# Patient Record
Sex: FEMALE | Race: BLACK OR AFRICAN AMERICAN | NOT HISPANIC OR LATINO | Employment: OTHER | URBAN - METROPOLITAN AREA
[De-identification: names, ages, dates, MRNs, and addresses within clinical notes are randomized per-mention and may not be internally consistent; named-entity substitution may affect disease eponyms.]

---

## 2023-06-02 LAB
ALANINE AMINOTRANSFERASE (SGPT) (U/L) IN SER/PLAS: 6 U/L (ref 7–45)
ALBUMIN (G/DL) IN SER/PLAS: 4.2 G/DL (ref 3.4–5)
ALKALINE PHOSPHATASE (U/L) IN SER/PLAS: 128 U/L (ref 33–136)
ANION GAP IN SER/PLAS: 11 MMOL/L (ref 10–20)
ASPARTATE AMINOTRANSFERASE (SGOT) (U/L) IN SER/PLAS: 11 U/L (ref 9–39)
BILIRUBIN TOTAL (MG/DL) IN SER/PLAS: 0.4 MG/DL (ref 0–1.2)
CALCIUM (MG/DL) IN SER/PLAS: 9.9 MG/DL (ref 8.6–10.6)
CARBON DIOXIDE, TOTAL (MMOL/L) IN SER/PLAS: 25 MMOL/L (ref 21–32)
CHLORIDE (MMOL/L) IN SER/PLAS: 114 MMOL/L (ref 98–107)
CHOLESTEROL (MG/DL) IN SER/PLAS: 170 MG/DL (ref 0–199)
CHOLESTEROL IN HDL (MG/DL) IN SER/PLAS: 38.8 MG/DL
CHOLESTEROL IN LDL (MG/DL) IN SER/PLAS BY DIRECT ASSAY: 126 MG/DL (ref 0–129)
CHOLESTEROL/HDL RATIO: 4.4
CREATININE (MG/DL) IN SER/PLAS: 1.29 MG/DL (ref 0.5–1.05)
ERYTHROCYTE DISTRIBUTION WIDTH (RATIO) BY AUTOMATED COUNT: 13.5 % (ref 11.5–14.5)
ERYTHROCYTE MEAN CORPUSCULAR HEMOGLOBIN CONCENTRATION (G/DL) BY AUTOMATED: 31.1 G/DL (ref 32–36)
ERYTHROCYTE MEAN CORPUSCULAR VOLUME (FL) BY AUTOMATED COUNT: 100 FL (ref 80–100)
ERYTHROCYTES (10*6/UL) IN BLOOD BY AUTOMATED COUNT: 5.04 X10E12/L (ref 4–5.2)
GFR FEMALE: 45 ML/MIN/1.73M2
GLUCOSE (MG/DL) IN SER/PLAS: 97 MG/DL (ref 74–99)
HEMATOCRIT (%) IN BLOOD BY AUTOMATED COUNT: 50.5 % (ref 36–46)
HEMOGLOBIN (G/DL) IN BLOOD: 15.7 G/DL (ref 12–16)
INR IN PPP BY COAGULATION ASSAY: 1.3 (ref 0.9–1.1)
LEUKOCYTES (10*3/UL) IN BLOOD BY AUTOMATED COUNT: 4.7 X10E9/L (ref 4.4–11.3)
NATRIURETIC PEPTIDE B (PG/ML) IN SER/PLAS: 50 PG/ML (ref 0–99)
NON-HDL CHOLESTEROL: 131 MG/DL
NRBC (PER 100 WBCS) BY AUTOMATED COUNT: 0 /100 WBC (ref 0–0)
PLATELETS (10*3/UL) IN BLOOD AUTOMATED COUNT: 122 X10E9/L (ref 150–450)
POTASSIUM (MMOL/L) IN SER/PLAS: 5 MMOL/L (ref 3.5–5.3)
PROTEIN TOTAL: 6.4 G/DL (ref 6.4–8.2)
PROTHROMBIN TIME (PT) IN PPP BY COAGULATION ASSAY: 15 SEC (ref 9.8–13.4)
SODIUM (MMOL/L) IN SER/PLAS: 145 MMOL/L (ref 136–145)
TRIGLYCERIDE (MG/DL) IN SER/PLAS: 64 MG/DL (ref 0–149)
UREA NITROGEN (MG/DL) IN SER/PLAS: 27 MG/DL (ref 6–23)

## 2023-06-07 LAB
COTININE BLOOD QUANTITATIVE: 297 NG/ML
NICOTINE BLOOD QUANTITATIVE: 13 NG/ML

## 2023-11-08 ENCOUNTER — TELEPHONE (OUTPATIENT)
Dept: CARDIOLOGY | Facility: CLINIC | Age: 68
End: 2023-11-08

## 2023-11-08 PROBLEM — I49.5 SICK SINUS SYNDROME (MULTI): Status: ACTIVE | Noted: 2023-11-08

## 2023-11-08 NOTE — TELEPHONE ENCOUNTER
Pt is hospitalized at [facility] due to Syncope she was having 10 and 12 second pauses Dr. Riley recommends a pace maker but pt wants to know what you recommend and should she have the pace maker at [facility] or should she transfer [facility].    Please call Dr. Riley cell     [phone]

## 2023-11-10 ENCOUNTER — APPOINTMENT (OUTPATIENT)
Dept: RADIOLOGY | Facility: HOSPITAL | Age: 68
DRG: 277 | End: 2023-11-10
Payer: MEDICARE

## 2023-11-10 ENCOUNTER — HOSPITAL ENCOUNTER (OUTPATIENT)
Facility: HOSPITAL | Age: 68
Setting detail: OBSERVATION
LOS: 1 days | Discharge: HOME | DRG: 277 | End: 2023-11-11
Attending: INTERNAL MEDICINE | Admitting: INTERNAL MEDICINE
Payer: MEDICARE

## 2023-11-10 ENCOUNTER — APPOINTMENT (OUTPATIENT)
Dept: CARDIOLOGY | Facility: HOSPITAL | Age: 68
DRG: 277 | End: 2023-11-10
Payer: MEDICARE

## 2023-11-10 DIAGNOSIS — I42.9 CARDIOMYOPATHY (MULTI): ICD-10-CM

## 2023-11-10 DIAGNOSIS — I49.5 SICK SINUS SYNDROME (MULTI): Primary | ICD-10-CM

## 2023-11-10 PROCEDURE — 99222 1ST HOSP IP/OBS MODERATE 55: CPT | Performed by: INTERNAL MEDICINE

## 2023-11-10 PROCEDURE — 02H63KZ INSERTION OF DEFIBRILLATOR LEAD INTO RIGHT ATRIUM, PERCUTANEOUS APPROACH: ICD-10-PCS | Performed by: INTERNAL MEDICINE

## 2023-11-10 PROCEDURE — 2500000001 HC RX 250 WO HCPCS SELF ADMINISTERED DRUGS (ALT 637 FOR MEDICARE OP): Performed by: PHYSICIAN ASSISTANT

## 2023-11-10 PROCEDURE — 33249 INSJ/RPLCMT DEFIB W/LEAD(S): CPT | Performed by: INTERNAL MEDICINE

## 2023-11-10 PROCEDURE — C1898 LEAD, PMKR, OTHER THAN TRANS: HCPCS | Performed by: INTERNAL MEDICINE

## 2023-11-10 PROCEDURE — 2500000004 HC RX 250 GENERAL PHARMACY W/ HCPCS (ALT 636 FOR OP/ED)

## 2023-11-10 PROCEDURE — 2500000004 HC RX 250 GENERAL PHARMACY W/ HCPCS (ALT 636 FOR OP/ED): Performed by: INTERNAL MEDICINE

## 2023-11-10 PROCEDURE — 2750000001 HC OR 275 NO HCPCS: Performed by: INTERNAL MEDICINE

## 2023-11-10 PROCEDURE — 99152 MOD SED SAME PHYS/QHP 5/>YRS: CPT | Performed by: INTERNAL MEDICINE

## 2023-11-10 PROCEDURE — 71045 X-RAY EXAM CHEST 1 VIEW: CPT | Performed by: RADIOLOGY

## 2023-11-10 PROCEDURE — 2060000001 HC INTERMEDIATE ICU ROOM DAILY

## 2023-11-10 PROCEDURE — 02HK3KZ INSERTION OF DEFIBRILLATOR LEAD INTO RIGHT VENTRICLE, PERCUTANEOUS APPROACH: ICD-10-PCS | Performed by: INTERNAL MEDICINE

## 2023-11-10 PROCEDURE — C1722 AICD, SINGLE CHAMBER: HCPCS | Performed by: INTERNAL MEDICINE

## 2023-11-10 PROCEDURE — G0378 HOSPITAL OBSERVATION PER HR: HCPCS

## 2023-11-10 PROCEDURE — C1777 LEAD, AICD, ENDO SINGLE COIL: HCPCS | Performed by: INTERNAL MEDICINE

## 2023-11-10 PROCEDURE — 2500000001 HC RX 250 WO HCPCS SELF ADMINISTERED DRUGS (ALT 637 FOR MEDICARE OP): Performed by: INTERNAL MEDICINE

## 2023-11-10 PROCEDURE — 94760 N-INVAS EAR/PLS OXIMETRY 1: CPT | Mod: 59

## 2023-11-10 PROCEDURE — 2780000003 HC OR 278 NO HCPCS: Performed by: INTERNAL MEDICINE

## 2023-11-10 PROCEDURE — 99153 MOD SED SAME PHYS/QHP EA: CPT | Performed by: INTERNAL MEDICINE

## 2023-11-10 PROCEDURE — 2500000004 HC RX 250 GENERAL PHARMACY W/ HCPCS (ALT 636 FOR OP/ED): Performed by: PHYSICIAN ASSISTANT

## 2023-11-10 PROCEDURE — C1892 INTRO/SHEATH,FIXED,PEEL-AWAY: HCPCS | Performed by: INTERNAL MEDICINE

## 2023-11-10 PROCEDURE — 71045 X-RAY EXAM CHEST 1 VIEW: CPT

## 2023-11-10 PROCEDURE — 2720000007 HC OR 272 NO HCPCS: Performed by: INTERNAL MEDICINE

## 2023-11-10 PROCEDURE — 0JH608Z INSERTION OF DEFIBRILLATOR GENERATOR INTO CHEST SUBCUTANEOUS TISSUE AND FASCIA, OPEN APPROACH: ICD-10-PCS | Performed by: INTERNAL MEDICINE

## 2023-11-10 PROCEDURE — 99221 1ST HOSP IP/OBS SF/LOW 40: CPT | Performed by: PHYSICIAN ASSISTANT

## 2023-11-10 DEVICE — DEFIBRILLATION LEAD
Type: IMPLANTABLE DEVICE | Site: HEART | Status: FUNCTIONAL
Brand: DURATA™

## 2023-11-10 DEVICE — IMPLANTABLE CARDIOVERTER DEFIBRILLATOR
Type: IMPLANTABLE DEVICE | Site: CHEST  WALL | Status: FUNCTIONAL
Brand: GALLANT™

## 2023-11-10 DEVICE — PACING LEAD
Type: IMPLANTABLE DEVICE | Site: HEART | Status: FUNCTIONAL
Brand: TENDRIL™

## 2023-11-10 RX ORDER — BUPIVACAINE HYDROCHLORIDE 2.5 MG/ML
INJECTION, SOLUTION EPIDURAL; INFILTRATION; INTRACAUDAL AS NEEDED
Status: DISCONTINUED | OUTPATIENT
Start: 2023-11-10 | End: 2023-11-10 | Stop reason: HOSPADM

## 2023-11-10 RX ORDER — PANTOPRAZOLE SODIUM 40 MG/1
40 TABLET, DELAYED RELEASE ORAL
Status: DISCONTINUED | OUTPATIENT
Start: 2023-11-11 | End: 2023-11-11 | Stop reason: HOSPADM

## 2023-11-10 RX ORDER — CEPHALEXIN 500 MG/1
500 CAPSULE ORAL EVERY 6 HOURS SCHEDULED
Status: DISCONTINUED | OUTPATIENT
Start: 2023-11-10 | End: 2023-11-11 | Stop reason: HOSPADM

## 2023-11-10 RX ORDER — ACETAMINOPHEN 325 MG/1
650 TABLET ORAL EVERY 4 HOURS PRN
Status: DISCONTINUED | OUTPATIENT
Start: 2023-11-10 | End: 2023-11-10 | Stop reason: SDUPTHER

## 2023-11-10 RX ORDER — FUROSEMIDE 20 MG/1
20 TABLET ORAL DAILY
Status: DISCONTINUED | OUTPATIENT
Start: 2023-11-10 | End: 2023-11-11 | Stop reason: HOSPADM

## 2023-11-10 RX ORDER — FUROSEMIDE 20 MG/1
1 TABLET ORAL EVERY OTHER DAY
COMMUNITY
Start: 2020-12-14 | End: 2023-11-30 | Stop reason: SDUPTHER

## 2023-11-10 RX ORDER — LOSARTAN POTASSIUM 25 MG/1
25 TABLET ORAL DAILY
COMMUNITY
End: 2023-11-30 | Stop reason: WASHOUT

## 2023-11-10 RX ORDER — TALC
3 POWDER (GRAM) TOPICAL DAILY
Status: DISCONTINUED | OUTPATIENT
Start: 2023-11-10 | End: 2023-11-11 | Stop reason: HOSPADM

## 2023-11-10 RX ORDER — POLYETHYLENE GLYCOL 3350 17 G/17G
17 POWDER, FOR SOLUTION ORAL DAILY
Status: DISCONTINUED | OUTPATIENT
Start: 2023-11-10 | End: 2023-11-11 | Stop reason: HOSPADM

## 2023-11-10 RX ORDER — ONDANSETRON 4 MG/1
4 TABLET, FILM COATED ORAL EVERY 8 HOURS PRN
Status: DISCONTINUED | OUTPATIENT
Start: 2023-11-10 | End: 2023-11-10 | Stop reason: SDUPTHER

## 2023-11-10 RX ORDER — GUAIFENESIN 600 MG/1
600 TABLET, EXTENDED RELEASE ORAL EVERY 12 HOURS PRN
Status: DISCONTINUED | OUTPATIENT
Start: 2023-11-10 | End: 2023-11-11 | Stop reason: HOSPADM

## 2023-11-10 RX ORDER — TALC
3 POWDER (GRAM) TOPICAL DAILY
Status: DISCONTINUED | OUTPATIENT
Start: 2023-11-10 | End: 2023-11-10

## 2023-11-10 RX ORDER — BUPIVACAINE HYDROCHLORIDE 2.5 MG/ML
INJECTION, SOLUTION EPIDURAL; INFILTRATION; INTRACAUDAL
Status: DISPENSED
Start: 2023-11-10 | End: 2023-11-11

## 2023-11-10 RX ORDER — ACETAMINOPHEN 325 MG/1
650 TABLET ORAL EVERY 4 HOURS PRN
Status: DISCONTINUED | OUTPATIENT
Start: 2023-11-10 | End: 2023-11-11 | Stop reason: HOSPADM

## 2023-11-10 RX ORDER — LOSARTAN POTASSIUM 25 MG/1
25 TABLET ORAL DAILY
Status: DISCONTINUED | OUTPATIENT
Start: 2023-11-10 | End: 2023-11-10

## 2023-11-10 RX ORDER — ACETAMINOPHEN 650 MG/1
650 SUPPOSITORY RECTAL EVERY 4 HOURS PRN
Status: DISCONTINUED | OUTPATIENT
Start: 2023-11-10 | End: 2023-11-11 | Stop reason: HOSPADM

## 2023-11-10 RX ORDER — ONDANSETRON 4 MG/1
4 TABLET, FILM COATED ORAL EVERY 8 HOURS PRN
Status: DISCONTINUED | OUTPATIENT
Start: 2023-11-10 | End: 2023-11-11 | Stop reason: HOSPADM

## 2023-11-10 RX ORDER — ACETAMINOPHEN AND CODEINE PHOSPHATE 300; 30 MG/1; MG/1
1 TABLET ORAL EVERY 4 HOURS PRN
Status: DISCONTINUED | OUTPATIENT
Start: 2023-11-10 | End: 2023-11-11 | Stop reason: HOSPADM

## 2023-11-10 RX ORDER — MIDAZOLAM HYDROCHLORIDE 1 MG/ML
INJECTION INTRAMUSCULAR; INTRAVENOUS
Status: DISPENSED
Start: 2023-11-10 | End: 2023-11-11

## 2023-11-10 RX ORDER — PANTOPRAZOLE SODIUM 40 MG/10ML
40 INJECTION, POWDER, LYOPHILIZED, FOR SOLUTION INTRAVENOUS
Status: DISCONTINUED | OUTPATIENT
Start: 2023-11-11 | End: 2023-11-11 | Stop reason: HOSPADM

## 2023-11-10 RX ORDER — ACETAMINOPHEN 160 MG/5ML
650 SOLUTION ORAL EVERY 4 HOURS PRN
Status: DISCONTINUED | OUTPATIENT
Start: 2023-11-10 | End: 2023-11-10 | Stop reason: SDUPTHER

## 2023-11-10 RX ORDER — MIDAZOLAM HYDROCHLORIDE 1 MG/ML
INJECTION, SOLUTION INTRAMUSCULAR; INTRAVENOUS AS NEEDED
Status: DISCONTINUED | OUTPATIENT
Start: 2023-11-10 | End: 2023-11-10 | Stop reason: HOSPADM

## 2023-11-10 RX ORDER — ONDANSETRON HYDROCHLORIDE 2 MG/ML
4 INJECTION, SOLUTION INTRAVENOUS EVERY 8 HOURS PRN
Status: DISCONTINUED | OUTPATIENT
Start: 2023-11-10 | End: 2023-11-10 | Stop reason: SDUPTHER

## 2023-11-10 RX ORDER — ATORVASTATIN CALCIUM 40 MG/1
40 TABLET, FILM COATED ORAL NIGHTLY
Status: DISCONTINUED | OUTPATIENT
Start: 2023-11-10 | End: 2023-11-11 | Stop reason: HOSPADM

## 2023-11-10 RX ORDER — MORPHINE SULFATE 10 MG/ML
2 INJECTION, SOLUTION INTRAMUSCULAR; INTRAVENOUS EVERY 4 HOURS PRN
Status: DISCONTINUED | OUTPATIENT
Start: 2023-11-10 | End: 2023-11-10

## 2023-11-10 RX ORDER — FENTANYL CITRATE 50 UG/ML
INJECTION, SOLUTION INTRAMUSCULAR; INTRAVENOUS
Status: DISPENSED
Start: 2023-11-10 | End: 2023-11-11

## 2023-11-10 RX ORDER — METOPROLOL SUCCINATE 100 MG/1
100 TABLET, EXTENDED RELEASE ORAL DAILY
COMMUNITY
End: 2023-11-30 | Stop reason: SDUPTHER

## 2023-11-10 RX ORDER — FENTANYL CITRATE 50 UG/ML
INJECTION, SOLUTION INTRAMUSCULAR; INTRAVENOUS AS NEEDED
Status: DISCONTINUED | OUTPATIENT
Start: 2023-11-10 | End: 2023-11-10 | Stop reason: HOSPADM

## 2023-11-10 RX ORDER — TRAMADOL HYDROCHLORIDE 50 MG/1
50 TABLET ORAL EVERY 8 HOURS PRN
Status: DISCONTINUED | OUTPATIENT
Start: 2023-11-10 | End: 2023-11-11 | Stop reason: HOSPADM

## 2023-11-10 RX ORDER — SODIUM CHLORIDE 9 MG/ML
100 INJECTION, SOLUTION INTRAVENOUS CONTINUOUS
Status: DISCONTINUED | OUTPATIENT
Start: 2023-11-10 | End: 2023-11-11 | Stop reason: HOSPADM

## 2023-11-10 RX ORDER — MORPHINE SULFATE 2 MG/ML
2 INJECTION, SOLUTION INTRAMUSCULAR; INTRAVENOUS EVERY 4 HOURS PRN
Status: DISCONTINUED | OUTPATIENT
Start: 2023-11-10 | End: 2023-11-11 | Stop reason: HOSPADM

## 2023-11-10 RX ORDER — ACETAMINOPHEN 160 MG/5ML
650 SOLUTION ORAL EVERY 4 HOURS PRN
Status: DISCONTINUED | OUTPATIENT
Start: 2023-11-10 | End: 2023-11-11 | Stop reason: HOSPADM

## 2023-11-10 RX ORDER — ACETAMINOPHEN 650 MG/1
650 SUPPOSITORY RECTAL EVERY 4 HOURS PRN
Status: DISCONTINUED | OUTPATIENT
Start: 2023-11-10 | End: 2023-11-10 | Stop reason: SDUPTHER

## 2023-11-10 RX ORDER — ENOXAPARIN SODIUM 100 MG/ML
40 INJECTION SUBCUTANEOUS EVERY 24 HOURS
Status: DISCONTINUED | OUTPATIENT
Start: 2023-11-10 | End: 2023-11-11 | Stop reason: HOSPADM

## 2023-11-10 RX ORDER — ONDANSETRON HYDROCHLORIDE 2 MG/ML
4 INJECTION, SOLUTION INTRAVENOUS EVERY 8 HOURS PRN
Status: DISCONTINUED | OUTPATIENT
Start: 2023-11-10 | End: 2023-11-11 | Stop reason: HOSPADM

## 2023-11-10 RX ORDER — NALOXONE HYDROCHLORIDE 1 MG/ML
0.2 INJECTION INTRAMUSCULAR; INTRAVENOUS; SUBCUTANEOUS EVERY 5 MIN PRN
Status: DISCONTINUED | OUTPATIENT
Start: 2023-11-10 | End: 2023-11-11 | Stop reason: HOSPADM

## 2023-11-10 RX ORDER — AMIODARONE HYDROCHLORIDE 200 MG/1
200 TABLET ORAL EVERY 24 HOURS
COMMUNITY
Start: 2023-06-12 | End: 2024-01-22

## 2023-11-10 RX ORDER — ACETAMINOPHEN 500 MG
1 TABLET ORAL DAILY
COMMUNITY
Start: 2020-06-22

## 2023-11-10 RX ADMIN — ATORVASTATIN CALCIUM 40 MG: 40 TABLET, FILM COATED ORAL at 20:52

## 2023-11-10 RX ADMIN — ACETAMINOPHEN 650 MG: 325 TABLET ORAL at 20:52

## 2023-11-10 RX ADMIN — SODIUM CHLORIDE 100 ML/HR: 9 INJECTION, SOLUTION INTRAVENOUS at 16:00

## 2023-11-10 RX ADMIN — FUROSEMIDE 20 MG: 20 TABLET ORAL at 19:46

## 2023-11-10 RX ADMIN — SACUBITRIL AND VALSARTAN 1 TABLET: 24; 26 TABLET, FILM COATED ORAL at 20:52

## 2023-11-10 RX ADMIN — CEPHALEXIN 500 MG: 500 CAPSULE ORAL at 18:07

## 2023-11-10 RX ADMIN — MORPHINE SULFATE 2 MG: 2 INJECTION, SOLUTION INTRAMUSCULAR; INTRAVENOUS at 22:33

## 2023-11-10 RX ADMIN — TRAMADOL HYDROCHLORIDE 50 MG: 50 TABLET, COATED ORAL at 20:51

## 2023-11-10 RX ADMIN — CEPHALEXIN 500 MG: 500 CAPSULE ORAL at 23:47

## 2023-11-10 SDOH — SOCIAL STABILITY: SOCIAL INSECURITY: DOES ANYONE TRY TO KEEP YOU FROM HAVING/CONTACTING OTHER FRIENDS OR DOING THINGS OUTSIDE YOUR HOME?: NO

## 2023-11-10 SDOH — SOCIAL STABILITY: SOCIAL INSECURITY: ABUSE: ADULT

## 2023-11-10 SDOH — SOCIAL STABILITY: SOCIAL INSECURITY: HAVE YOU HAD THOUGHTS OF HARMING ANYONE ELSE?: NO

## 2023-11-10 SDOH — SOCIAL STABILITY: SOCIAL INSECURITY: DO YOU FEEL UNSAFE GOING BACK TO THE PLACE WHERE YOU ARE LIVING?: NO

## 2023-11-10 SDOH — SOCIAL STABILITY: SOCIAL INSECURITY: DO YOU FEEL ANYONE HAS EXPLOITED OR TAKEN ADVANTAGE OF YOU FINANCIALLY OR OF YOUR PERSONAL PROPERTY?: NO

## 2023-11-10 SDOH — SOCIAL STABILITY: SOCIAL INSECURITY: ARE THERE ANY APPARENT SIGNS OF INJURIES/BEHAVIORS THAT COULD BE RELATED TO ABUSE/NEGLECT?: NO

## 2023-11-10 SDOH — SOCIAL STABILITY: SOCIAL INSECURITY: HAS ANYONE EVER THREATENED TO HURT YOUR FAMILY OR YOUR PETS?: NO

## 2023-11-10 SDOH — SOCIAL STABILITY: SOCIAL INSECURITY: WERE YOU ABLE TO COMPLETE ALL THE BEHAVIORAL HEALTH SCREENINGS?: YES

## 2023-11-10 SDOH — SOCIAL STABILITY: SOCIAL INSECURITY: ARE YOU OR HAVE YOU BEEN THREATENED OR ABUSED PHYSICALLY, EMOTIONALLY, OR SEXUALLY BY ANYONE?: NO

## 2023-11-10 ASSESSMENT — COGNITIVE AND FUNCTIONAL STATUS - GENERAL
MOVING TO AND FROM BED TO CHAIR: A LITTLE
DRESSING REGULAR UPPER BODY CLOTHING: A LITTLE
PATIENT BASELINE BEDBOUND: NO
TOILETING: A LITTLE
WALKING IN HOSPITAL ROOM: A LITTLE
DAILY ACTIVITIY SCORE: 22
CLIMB 3 TO 5 STEPS WITH RAILING: A LITTLE
DAILY ACTIVITIY SCORE: 24
MOBILITY SCORE: 24
MOBILITY SCORE: 20
STANDING UP FROM CHAIR USING ARMS: A LITTLE

## 2023-11-10 ASSESSMENT — PAIN SCALES - GENERAL
PAINLEVEL_OUTOF10: 0 - NO PAIN
PAINLEVEL_OUTOF10: 0 - NO PAIN
PAINLEVEL_OUTOF10: 9
PAINLEVEL_OUTOF10: 9
PAINLEVEL_OUTOF10: 0 - NO PAIN
PAINLEVEL_OUTOF10: 0 - NO PAIN

## 2023-11-10 ASSESSMENT — PAIN DESCRIPTION - ORIENTATION
ORIENTATION: LEFT
ORIENTATION: LEFT

## 2023-11-10 ASSESSMENT — COLUMBIA-SUICIDE SEVERITY RATING SCALE - C-SSRS
1. IN THE PAST MONTH, HAVE YOU WISHED YOU WERE DEAD OR WISHED YOU COULD GO TO SLEEP AND NOT WAKE UP?: NO
2. HAVE YOU ACTUALLY HAD ANY THOUGHTS OF KILLING YOURSELF?: NO
6. HAVE YOU EVER DONE ANYTHING, STARTED TO DO ANYTHING, OR PREPARED TO DO ANYTHING TO END YOUR LIFE?: NO

## 2023-11-10 ASSESSMENT — ACTIVITIES OF DAILY LIVING (ADL)
ADEQUATE_TO_COMPLETE_ADL: YES
GROOMING: INDEPENDENT
HEARING - RIGHT EAR: FUNCTIONAL
TOILETING: INDEPENDENT
JUDGMENT_ADEQUATE_SAFELY_COMPLETE_DAILY_ACTIVITIES: YES
LACK_OF_TRANSPORTATION: NO
HEARING - LEFT EAR: FUNCTIONAL
DRESSING YOURSELF: INDEPENDENT
FEEDING YOURSELF: INDEPENDENT
WALKS IN HOME: INDEPENDENT
PATIENT'S MEMORY ADEQUATE TO SAFELY COMPLETE DAILY ACTIVITIES?: YES
BATHING: INDEPENDENT

## 2023-11-10 ASSESSMENT — LIFESTYLE VARIABLES
SUBSTANCE_ABUSE_PAST_12_MONTHS: NO
HOW OFTEN DO YOU HAVE 6 OR MORE DRINKS ON ONE OCCASION: NEVER
SKIP TO QUESTIONS 9-10: 1
AUDIT-C TOTAL SCORE: 0
HOW MANY STANDARD DRINKS CONTAINING ALCOHOL DO YOU HAVE ON A TYPICAL DAY: PATIENT DOES NOT DRINK
HOW OFTEN DO YOU HAVE A DRINK CONTAINING ALCOHOL: NEVER
AUDIT-C TOTAL SCORE: 0
PRESCIPTION_ABUSE_PAST_12_MONTHS: NO

## 2023-11-10 ASSESSMENT — PATIENT HEALTH QUESTIONNAIRE - PHQ9
SUM OF ALL RESPONSES TO PHQ9 QUESTIONS 1 & 2: 0
1. LITTLE INTEREST OR PLEASURE IN DOING THINGS: NOT AT ALL
2. FEELING DOWN, DEPRESSED OR HOPELESS: NOT AT ALL

## 2023-11-10 ASSESSMENT — PAIN - FUNCTIONAL ASSESSMENT
PAIN_FUNCTIONAL_ASSESSMENT: 0-10

## 2023-11-10 ASSESSMENT — PAIN SCALES - PAIN ASSESSMENT IN ADVANCED DEMENTIA (PAINAD)
FACIALEXPRESSION: SMILING OR INEXPRESSIVE
CONSOLABILITY: NO NEED TO CONSOLE
BREATHING: NORMAL
BODYLANGUAGE: RELAXED
TOTALSCORE: 0

## 2023-11-10 ASSESSMENT — PAIN DESCRIPTION - LOCATION
LOCATION: CHEST
LOCATION: CHEST

## 2023-11-10 ASSESSMENT — PAIN DESCRIPTION - DESCRIPTORS: DESCRIPTORS: DISCOMFORT;ACHING

## 2023-11-10 NOTE — NURSING NOTE
1100 Pt arrived via stretcher and ems to room 406. Pt ambulated to bed and placed on monitor. Pt oriented to room and call light in reach. Dr. Cloud notified of pt arrival. Cardiology also aware of pt arrival.   1205 Pt taken to cath lab for pacer placement.   1545 Pt back to floor from cath lab. Bedside report received. Pt ambulated to bed and instructed on not using L arm. L chest dressing dry and intact.

## 2023-11-10 NOTE — H&P
History Of Present Illness  Buffy Temple is a 68 y.o. female presenting with Afib, Syncope and pauses with SSS for Dual ICD  With Dr Pemberton.     Past Medical History  Past Medical History:   Diagnosis Date    Essential (primary) hypertension 11/18/2022    Hypertension    Other conditions influencing health status 08/18/2013    Nontoxic Goiter    Personal history of other endocrine, nutritional and metabolic disease     History of hyperlipidemia    Personal history of other specified conditions     History of shortness of breath       Surgical History  Past Surgical History:   Procedure Laterality Date    MR HEAD ANGIO WO IV CONTRAST  9/13/2015    MR HEAD ANGIO WO IV CONTRAST 9/13/2015 [facility] CLINICAL LEGACY    MR NECK ANGIO W IV CONTRAST  9/13/2015    MR NECK ANGIO W IV CONTRAST 9/13/2015 [facility] CLINICAL LEGACY    OTHER SURGICAL HISTORY  08/23/2013    Atrial Cardioversion        Social History  She reports that she has been smoking cigarettes. She has never used smokeless tobacco. She reports that she does not currently use alcohol. She reports that she does not currently use drugs.    Family History  No family history on file.     Allergies  Patient has no known allergies.    Review of Systems   Cardiovascular:         Sss        Physical Exam  Vitals reviewed.   HENT:      Head: Normocephalic.      Mouth/Throat:      Mouth: Mucous membranes are moist.      Comments: Mallampati II  Eyes:      Pupils: Pupils are equal, round, and reactive to light.   Cardiovascular:      Rate and Rhythm: Normal rate. Rhythm irregular.   Pulmonary:      Effort: Pulmonary effort is normal.      Breath sounds: Normal breath sounds.   Abdominal:      Palpations: Abdomen is soft.   Musculoskeletal:         General: Normal range of motion.      Cervical back: Normal range of motion and neck supple.   Skin:     General: Skin is warm and dry.   Neurological:      General: No focal deficit present.      Mental Status: She is alert.  "  Psychiatric:         Mood and Affect: Mood normal.          Last Recorded Vitals  Blood pressure 142/72, pulse 83, temperature 36.8 °C (98.2 °F), temperature source Tympanic, resp. rate 18, height 1.753 m (5' 9\"), weight 83.9 kg (185 lb), SpO2 97 %.    Relevant Results  No current facility-administered medications on file prior to encounter.     Current Outpatient Medications on File Prior to Encounter   Medication Sig Dispense Refill    amiodarone (Pacerone) 200 mg tablet Take 1 tablet (200 mg) by mouth once every 24 hours.      cholecalciferol (Vitamin D-3) 50 mcg (2,000 unit) capsule Take 1 capsule (50 mcg) by mouth once daily.      furosemide (Lasix) 20 mg tablet Take 1 tablet (20 mg) by mouth once daily.      losartan (Cozaar) 25 mg tablet Take 1 tablet (25 mg) by mouth once daily.      metoprolol succinate XL (Toprol-XL) 100 mg 24 hr tablet Take 1 tablet (100 mg) by mouth every 12 hours.           Results for orders placed or performed during the hospital encounter of 11/10/23 (from the past 24 hour(s))   Cardiac Device Check - Inpatient   Result Value Ref Range    BSA 2.02 m2          Assessment/Plan   Principal Problem:    Sick sinus syndrome (CMS/HCC)  With CMO for Dual ICD with Dr Pemberton.   I spent 15 minutes in the professional and overall care of this patient.    Leena Jaquez PA-C    "

## 2023-11-10 NOTE — CONSULTS
Inpatient consult to Cardiology  Consult performed by: Jose J Sands DO  Consult ordered by: Varun Cloud MD  Reason for consult: Sick sinus syndrome  Assessment/Recommendations: Presented to [facility] with Presyncope in the setting of Atrial Fibrillation with RVR and post-conversion pauses lasting up to 14 seconds. Transferred for AICD Implantation in the setting of Prolonged Post-Conversion Pauses for Atrial Fibrillation, Systolic Heart failure.   -- To receive Dual Chamber AICD today; May have Cardiac Diet when on Floor post Device Implantation   -- Resume Oral AC within 24-48 hours post procedure   -- Usual Device Recovery   -- Anticipated discharge in 24-48 hours.         History Of Present Illness:    Buffy Temple is a 68 y.o. female -American female pertinent cardiac history of PEA cardiopulmonary arrest 1/31/2020 in setting of septic shock/afib RVR,  permanent atrial fibrillation with prior failed PVI 2014, chronic systolic heart failure, chronic nonischemic cardiomyopathy, cva, chronic kidney disease, thrombocytopenia and current smoker.  Presented from outside hospital with 10-12 seconds pauses. Cardiology is consulted for sick sinus rhythm.       Patient reports this past Sunday she was sitting in a couch watching me when she suddenly felt dizzy, lightheaded and diaphoresis.  Couple of seconds after she had episode where she lost consciousness.  When she came to she reports feeling scared.  Denies any chest pain or shortness of breath.  She presented to [facility] for further evaluation.  She was admitted inpatient for further management.  She was seen by cardiology and neurology.  Endocrinology was also consulted for abnormal thyroid studies.  She was noted to have significant pauses on telemetry as long as 14 seconds.  Amiodarone and metoprolol were stopped.  She was transferred to [facility] for permanent pacemaker implant.        Last Recorded Vitals:  LABS:  CMP:      No lab  "exists for component: \"CA\"  CBC:    COAG:     ABO: No results found for: \"ABO\"  HEME/ENDO:  Results from last 7 days   Lab Units 11/06/23  0340   HEMOGLOBIN A1C % 6.1*      CARDIAC:       No lab exists for component: \"CK\", \"CKMBP\"     Cardiac Device Check - Inpatient         Electrophysiology procedure    (Results Pending)   Electrophysiology procedure    (Results Pending)         Last I/O:  No intake/output data recorded.    Past Cardiology Tests (Last 3 Years):  EKG:  No results found for this or any previous visit from the past 1095 days.    Echo:  6/2023  1. Left ventricular systolic function is mildly to moderately decreased with a 35-40% estimated ejection fraction.  2. There is global hypokinesis of the left ventricle with minor regional variations.  3. Spectral Doppler shows an impaired relaxation pattern of left ventricular diastolic filling.  4. Increased LV mass.  5. There is moderate concentric left ventricular hypertrophy.  6. The left ventricular posterior wall thickness is moderately increased.  7. There is mildly reduced right ventricular systolic function.  8. The left atrium is moderate to severely dilated.  9. Moderate mitral valve regurgitation.  10. Compared with study from 12/9/2021, there is a      12/2021   1. The left ventricular systolic function is normal with a 55-60% estimated ejection fraction.   2. The left atrium is severely dilated.   3. Interval improvement in Mitral Regurgitation Volume from 17.60 ml to 15.91 ml.   4. RVSP within normal limits        Ejection Fractions:  No results found for: \"EF\"  Cath:  No results found for this or any previous visit from the past 1095 days.    Stress Test:  No results found for this or any previous visit from the past 1095 days.    Cardiac Imaging:  No results found for this or any previous visit from the past 1095 days.      Past Medical History:  She has a past medical history of Essential (primary) hypertension (11/18/2022), Other conditions " influencing health status (08/18/2013), Personal history of other endocrine, nutritional and metabolic disease, and Personal history of other specified conditions.    Past Surgical History:  She has a past surgical history that includes Other surgical history (08/23/2013); MR angio head wo IV contrast (9/13/2015); and MR angio neck w IV contrast (9/13/2015).      Social History:  She reports that she has been smoking cigarettes. She has never used smokeless tobacco. She reports that she does not currently use alcohol. She reports that she does not currently use drugs.    Family History:  No family history on file.     Allergies:  Patient has no known allergies.    Inpatient Medications:  Scheduled medications   Medication Dose Route Frequency    [Held by provider] enoxaparin  40 mg subcutaneous q24h    melatonin  3 mg oral Daily    [START ON 11/11/2023] pantoprazole  40 mg oral Daily before breakfast    Or    [START ON 11/11/2023] pantoprazole  40 mg intravenous Daily before breakfast    polyethylene glycol  17 g oral Daily    vancomycin  1,500 mg intravenous Once     PRN medications   Medication    acetaminophen    Or    acetaminophen    Or    acetaminophen    guaiFENesin    ondansetron    Or    ondansetron     Continuous Medications   Medication Dose Last Rate    sodium chloride 0.9%  100 mL/hr       Outpatient Medications:  Current Outpatient Medications   Medication Instructions    amiodarone (PACERONE) 200 mg, oral, Every 24 hours    cholecalciferol (Vitamin D-3) 50 mcg (2,000 unit) capsule 1 capsule, oral, Daily    furosemide (Lasix) 20 mg tablet 1 tablet, oral, Daily    losartan (COZAAR) 25 mg, oral, Daily    metoprolol succinate XL (TOPROL-XL) 100 mg, oral, Every 12 hours       Physical Exam:  General: Alert and oriented  SKIN: Warm and dry  NECK: Supple, No JVD .  LUNGS: Lungs clear to auscultation  CARDIAC: irregular, no murmurs  ABDOMEN: Abdomen soft, non-tender  EXTREMITIES: No lower extremity edema        Assessment/Plan   Buffy Temple is a 68 y.o. female -American female pertinent cardiac history of permanent atrial fibrillation, chronic systolic heart failure, chronic nonischemic cardiomyopathy, cva, chronic kidney disease and current smoker.  Presented from outside hospital with 10-12 seconds pauses. Cardiology is consulted for sick sinus rhythm.     Sick sinus syndrome- with pauses as long at 12 seconds noted on telemetry at OSH.  She was transferred to [facility] for PPM implant this afternoon with Dr. Pemberton   Permanent atrial fibrillation- Beta blocker  and amiodarone held in setting of significant pauses. Can resume after PPM implant.  Xarelto prior to procedure.  Resume when okay with EP.   Chronic systolic heart failure- Euvolemic on clinical exam. Resume Entresto 24-26mg BID. Resume BB when safe to do so/device interrogation  post implant.   HTN- Continue amlodipine 2.5 mg daily   HLD- Continue statin     Recommendations  PPM implant tomorrow   Chest xray post implant today and tomorrow  Device interrogation- to be done by device RN     Code Status:  Full Code        PANCHITO Ayers-ELINOR    ==============================================  Attending Note   ===============================================    Both the AFTAB and I have had a face to face encounter with the patient today. I have examined the patient and edited the documented physical examination as necessary.  I personally reviewed the patient's recent labs, medications, orders, EKGs, and pertinent cardiac imaging.  I have reviewed the AFTAB's encounter note, approve the AFTAB's documentation and have edited the note to reflect the diagnostic and therapeutic plan.    Buffy Temple is a 68 y.o. female -American female pertinent cardiac history of PEA cardiopulmonary arrest 1/31/2020 in setting of septic shock/afib RVR,  permanent atrial fibrillation with prior failed PVI 2014, chronic systolic heart failure, chronic  nonischemic cardiomyopathy, cva, chronic kidney disease, thrombocytopenia and current smoker.  Presented from outside hospital with 10-12 seconds pauses. Cardiology is consulted for sick sinus rhythm.       Patient reports this past Sunday she was sitting in a couch watching me when she suddenly felt dizzy, lightheaded and diaphoresis.  Couple of seconds after she had episode where she lost consciousness.  When she came to she reports feeling scared.  Denies any chest pain or shortness of breath.  She presented to [facility] for further evaluation.  She was admitted inpatient for further management.  She was seen by cardiology and neurology.  Endocrinology was also consulted for abnormal thyroid studies.  She was noted to have significant pauses on telemetry as long as 14 seconds.  Amiodarone and metoprolol were stopped.  She was transferred to [facility] for permanent pacemaker implant.        No exacerbating or relieving factors.  Patient denies chest pain and angina.  Pt denies shortness of breath, dyspnea on exertion, orthopnea, and paroxysmal nocturnal dyspnea.  Pt denies worsening lower extremity edema.  Pt denies palpitations or syncope.  No recent falls.  No fever or chills.  No cough.  No change in bowel or bladder habits.  No sick contacts.  No recent travel.     12 point review of systems including (Constitutional, Eyes, ENMT, Respiratory, Cardiac, Gastrointestinal, Neurological, Psychiatric, and Hematologic) was performed and is otherwise negative.    Past medical history:  As above.    Medications were reviewed.    Allergies were reviewed.    Social history:  Patient denies smoking, alcohol abuse, or illicit drug use.    Family history:  No sudden cardiac death or premature coronary artery disease.     Patient seen and examined   General:  Patient is awake, alert, and oriented.  Patient is in no acute distress.  HEENT:  Pupils equal and reactive.  Normocephalic.  Moist mucosa.    Neck:  No thyromegaly.   Normal Jugular Venous Pressure.  Cardiovascular:  Regular rate and rhythm.  Normal S1 and S2.  Pulmonary:  Clear to auscultation bilaterally.  Abdomen:  Soft. Non-tender.   Non-distended.  Positive bowel sounds.  Lower Extremities:  2+ pedal pulses. No LE edema.  Neurologic:  Cranial nerves intact.  No focal deficit.   Skin: Skin warm and dry, normal skin turgor.   Psychiatric: Normal affect.    Vital signs, telemetry, medications, labs, and imaging were reviewed as well.      Buffy Temple is a 68 y.o. female -American female pertinent cardiac history of permanent atrial fibrillation, chronic systolic heart failure, chronic nonischemic cardiomyopathy, cva, chronic kidney disease and current smoker.  Presented from outside hospital with 10-12 seconds pauses. Cardiology is consulted for sick sinus rhythm.     Sick sinus syndrome- with pauses as long at 12 seconds noted on telemetry at OSH.  She was [place] [facility] [place] [facility]PM [place] this afternoon with Dr. Pemberton   Permanent atrial fibrillation- Beta blocker  and amiodarone held in setting of significant pauses. Can resume after PPM implant.  Xarelto prior to procedure.  Resume when okay with EP.   Chronic systolic heart failure- Euvolemic on clinical exam. Resume Entresto 24-26mg BID. Resume BB when safe to do so/device interrogation  post implant.   HTN- Continue amlodipine 2.5 mg daily   HLD- Continue statin     Recommendations  PPM implant tomorrow   Chest xray post implant today and tomorrow  Device interrogation- to be done by device RN  Anticipated to discharge home on chronic medications within 24-48 hours.     Follow up with Device Clinic as per Protocol   Follow up with Cardiology in 3-5 weeks post discharge    Reviewed and approved by JD JOHNS on 11/10/23 at 3:18 PM.

## 2023-11-10 NOTE — Clinical Note
The DEFIBRILLATOR, ICD, DUAL CHAMBER, AIDAN DR - [id number] - [id number] device was inserted. The leads were placed into the connector and visually verified to be in correct position. Injury current obtained.

## 2023-11-10 NOTE — H&P
Buffy Temple is a 68 y.o. female   HPI   Patient with a past medical history of hypertension atrial fibrillation goiter dyslipidemia was admitted to [facility] with syncopal episodes  During the stay in the hospital she was noted to have pauses that were lasting up to 14 seconds and rapid ventricular rate when she was ambulating  After discussion with the patient decision was made to place a pacemaker  She was therefore transferred to [facility] for AICD placement      Past Medical History  Past Medical History:   Diagnosis Date    Essential (primary) hypertension 11/18/2022    Hypertension    Other conditions influencing health status 08/18/2013    Nontoxic Goiter    Personal history of other endocrine, nutritional and metabolic disease     History of hyperlipidemia    Personal history of other specified conditions     History of shortness of breath       Surgical History  Past Surgical History:   Procedure Laterality Date    MR HEAD ANGIO WO IV CONTRAST  9/13/2015    MR HEAD ANGIO WO IV CONTRAST 9/13/2015 [facility] CLINICAL LEGACY    MR NECK ANGIO W IV CONTRAST  9/13/2015    MR NECK ANGIO W IV CONTRAST 9/13/2015 [facility] CLINICAL LEGACY    OTHER SURGICAL HISTORY  08/23/2013    Atrial Cardioversion        Social History  She reports that she has been smoking cigarettes. She has never used smokeless tobacco. She reports that she does not currently use alcohol. She reports that she does not currently use drugs.    Family History  No family history on file.     Allergies  Patient has no known allergies.    Review of Systems     Constitutional: not feeling poorly, no fever, no recent weight gain and no recent weight loss.   Eyes: no blurred vision and no diplopia.   ENT: no hearing loss, no tinnitus, no earache, no sore throat, no hoarseness and no swollen glands in the neck.   Cardiovascular: no chest pain, no tightness or heavy pressure, no shortness of breath, no palpitations and no lower extremity  edema.   Respiratory: no cough, wheezing or shortness of breath at rest or exertion  Gastrointestinal: no change in bowel habits, no diarrhea, no constipation, no bloody stools, no nausea, no vomiting, no abdominal pain, no signs and symptoms of ulcer disease, no hugo colored stools and no intolerance to fatty foods.   Genitourinary: no urinary frequency, no dysuria, no hematuria, no burning sensation during urination, urinary stream is not smaller and urinary stream does not start and stop.   Musculoskeletal: no arthralgias, no joint stiffness, no muscle weakness, no back pain and no difficulty walking.   Skin: no rashes, no change in skin color and pigmentation, no skin lesions and no skin lumps.   Neurological: no headaches, no dizziness, no seizures, no tingling, no numbness, no signs and symptoms of stroke and no limb weakness.   Psychiatric: no confusion, no memory lapses or loss, no depression and no sleep disturbances.   Endocrine: no goiter, no thyroid disorder, no diabetes mellitus, no excessive thirst, no dry skin, no cold intolerance, no heat intolerance and no increased urinary frequency.   Hematologic/Lymphatic: is not slow to heal, does not bleed easily, does not bruise easily, no thrombophlebitis, no anemia and no history of blood transfusion.   All other systems have been reviewed and are negative for complaint.     Vitals:    11/10/23 1528   BP: (!) 175/103   Pulse: 72   Resp: 16   Temp:    SpO2: 99%        Scheduled medications  bupivacaine PF, , ,   cephalexin, 500 mg, oral, q6h YURI  [Held by provider] enoxaparin, 40 mg, subcutaneous, q24h  fentaNYL PF, , ,   melatonin, 3 mg, oral, Daily  midazolam, , ,   [START ON 11/11/2023] pantoprazole, 40 mg, oral, Daily before breakfast   Or  [START ON 11/11/2023] pantoprazole, 40 mg, intravenous, Daily before breakfast  polyethylene glycol, 17 g, oral, Daily      Continuous medications  sodium chloride 0.9%, 100 mL/hr      PRN medications  PRN medications:  "acetaminophen **OR** acetaminophen **OR** acetaminophen, acetaminophen-codeine, bupivacaine PF, fentaNYL PF, guaiFENesin, midazolam, morphine, naloxone, ondansetron **OR** ondansetron              No lab exists for component: \"LABALBU\"         Electrophysiology procedure   Final Result      Cardiac Device Check - Inpatient         Electrophysiology procedure    (Results Pending)   XR chest 1 view    (Results Pending)   Cardiac device check - Inpatient    (Results Pending)       Physical Exam     Constitutional   General appearance: Alert and in no acute distress.   Eyes   Inspection of eyes: Sclera and conjunctiva were normal.    Pupil exam: Pupils were equal in size. Extraocular movements were intact.   Pulmonary   Respiratory assessment: No respiratory distress, normal respiratory rhythm and effort.    Auscultation of Lungs: Clear bilateral breath sounds.   Cardiovascular   Auscultation of heart: Apical pulse irregular rhythm normal S1 and S2, no murmurs and no pericardial rub.    Exam for edema: No peripheral edema.   Abdomen   Abdominal Exam: No bruits, normal bowel sounds, soft, non-tender, no abdominal mass palpated.    Liver and Spleen exam: No hepato-splenomegaly.   Musculoskeletal   Examination of gait: Normal.    Inspection of digits and nails: No clubbing or cyanosis of the fingernails.    Inspection/palpation of joints, bones and muscles: No joint swelling. Normal movement of all extremities.   Skin   Skin inspection: Normal skin color and pigmentation, normal skin turgor and no visible rash.   Neurologic   Cranial nerves: Nerves 2-12 were intact, no focal neuro defects.   Psychiatric   Orientation: Oriented to person, place, and time.    Mood and affect: Normal.       Assessment/Plan      #Syncope  #Sick sinus syndrome  #Atrial fibrillation  S/p AICD placement  Stable  We will start tramadol for pain control  Tomorrow would like to ambulate the patient and make sure that his heart rates are under " control as they have been going into RVR on exertion    #Hypertension  Continue daily amlodipine    #Dyslipidemia  Continue statins

## 2023-11-11 ENCOUNTER — APPOINTMENT (OUTPATIENT)
Dept: RADIOLOGY | Facility: HOSPITAL | Age: 68
DRG: 277 | End: 2023-11-11
Payer: MEDICARE

## 2023-11-11 VITALS
DIASTOLIC BLOOD PRESSURE: 87 MMHG | BODY MASS INDEX: 29.16 KG/M2 | TEMPERATURE: 97.9 F | HEIGHT: 69 IN | OXYGEN SATURATION: 97 % | RESPIRATION RATE: 18 BRPM | WEIGHT: 196.87 LBS | SYSTOLIC BLOOD PRESSURE: 139 MMHG | HEART RATE: 75 BPM

## 2023-11-11 PROBLEM — Z95.810 PRESENCE OF AUTOMATIC (IMPLANTABLE) CARDIAC DEFIBRILLATOR: Status: ACTIVE | Noted: 2023-11-11

## 2023-11-11 LAB
ANION GAP SERPL CALC-SCNC: 13 MMOL/L (ref 10–20)
BUN SERPL-MCNC: 26 MG/DL (ref 6–23)
CALCIUM SERPL-MCNC: 8.2 MG/DL (ref 8.6–10.3)
CHLORIDE SERPL-SCNC: 113 MMOL/L (ref 98–107)
CO2 SERPL-SCNC: 20 MMOL/L (ref 21–32)
CREAT SERPL-MCNC: 1.96 MG/DL (ref 0.5–1.05)
ERYTHROCYTE [DISTWIDTH] IN BLOOD BY AUTOMATED COUNT: 12.9 % (ref 11.5–14.5)
GFR SERPL CREATININE-BSD FRML MDRD: 27 ML/MIN/1.73M*2
GLUCOSE SERPL-MCNC: 129 MG/DL (ref 74–99)
HCT VFR BLD AUTO: 39.7 % (ref 36–46)
HGB BLD-MCNC: 12.6 G/DL (ref 12–16)
MCH RBC QN AUTO: 31.3 PG (ref 26–34)
MCHC RBC AUTO-ENTMCNC: 31.7 G/DL (ref 32–36)
MCV RBC AUTO: 99 FL (ref 80–100)
NRBC BLD-RTO: 0 /100 WBCS (ref 0–0)
PLATELET # BLD AUTO: 86 X10*3/UL (ref 150–450)
POTASSIUM SERPL-SCNC: 4.9 MMOL/L (ref 3.5–5.3)
RBC # BLD AUTO: 4.02 X10*6/UL (ref 4–5.2)
SODIUM SERPL-SCNC: 141 MMOL/L (ref 136–145)
WBC # BLD AUTO: 5 X10*3/UL (ref 4.4–11.3)

## 2023-11-11 PROCEDURE — 85027 COMPLETE CBC AUTOMATED: CPT | Performed by: PHYSICIAN ASSISTANT

## 2023-11-11 PROCEDURE — 80048 BASIC METABOLIC PNL TOTAL CA: CPT | Performed by: PHYSICIAN ASSISTANT

## 2023-11-11 PROCEDURE — G0378 HOSPITAL OBSERVATION PER HR: HCPCS

## 2023-11-11 PROCEDURE — 2500000004 HC RX 250 GENERAL PHARMACY W/ HCPCS (ALT 636 FOR OP/ED): Performed by: PHYSICIAN ASSISTANT

## 2023-11-11 PROCEDURE — 71046 X-RAY EXAM CHEST 2 VIEWS: CPT

## 2023-11-11 PROCEDURE — 2500000001 HC RX 250 WO HCPCS SELF ADMINISTERED DRUGS (ALT 637 FOR MEDICARE OP): Performed by: PHYSICIAN ASSISTANT

## 2023-11-11 PROCEDURE — 2500000001 HC RX 250 WO HCPCS SELF ADMINISTERED DRUGS (ALT 637 FOR MEDICARE OP): Performed by: INTERNAL MEDICINE

## 2023-11-11 PROCEDURE — 99239 HOSP IP/OBS DSCHRG MGMT >30: CPT | Performed by: INTERNAL MEDICINE

## 2023-11-11 PROCEDURE — 71046 X-RAY EXAM CHEST 2 VIEWS: CPT | Performed by: RADIOLOGY

## 2023-11-11 PROCEDURE — 93010 ELECTROCARDIOGRAM REPORT: CPT | Performed by: INTERNAL MEDICINE

## 2023-11-11 PROCEDURE — 99232 SBSQ HOSP IP/OBS MODERATE 35: CPT | Performed by: INTERNAL MEDICINE

## 2023-11-11 PROCEDURE — 36415 COLL VENOUS BLD VENIPUNCTURE: CPT | Performed by: PHYSICIAN ASSISTANT

## 2023-11-11 RX ORDER — ATORVASTATIN CALCIUM 40 MG/1
40 TABLET, FILM COATED ORAL NIGHTLY
Qty: 30 TABLET | Refills: 1 | Status: SHIPPED | OUTPATIENT
Start: 2023-11-11 | End: 2023-11-30 | Stop reason: SDUPTHER

## 2023-11-11 RX ORDER — TRAMADOL HYDROCHLORIDE 50 MG/1
50 TABLET ORAL EVERY 8 HOURS PRN
Qty: 10 TABLET | Refills: 0 | Status: SHIPPED | OUTPATIENT
Start: 2023-11-11 | End: 2023-11-16

## 2023-11-11 RX ORDER — CEPHALEXIN 500 MG/1
500 CAPSULE ORAL EVERY 6 HOURS SCHEDULED
Qty: 25 CAPSULE | Refills: 0 | Status: SHIPPED | OUTPATIENT
Start: 2023-11-11 | End: 2023-11-18

## 2023-11-11 RX ADMIN — ONDANSETRON 4 MG: 2 INJECTION INTRAMUSCULAR; INTRAVENOUS at 00:52

## 2023-11-11 RX ADMIN — PANTOPRAZOLE SODIUM 40 MG: 40 TABLET, DELAYED RELEASE ORAL at 06:56

## 2023-11-11 RX ADMIN — CEPHALEXIN 500 MG: 500 CAPSULE ORAL at 06:50

## 2023-11-11 RX ADMIN — SODIUM CHLORIDE 100 ML/HR: 9 INJECTION, SOLUTION INTRAVENOUS at 05:14

## 2023-11-11 RX ADMIN — FUROSEMIDE 20 MG: 20 TABLET ORAL at 08:06

## 2023-11-11 RX ADMIN — SACUBITRIL AND VALSARTAN 1 TABLET: 24; 26 TABLET, FILM COATED ORAL at 08:06

## 2023-11-11 RX ADMIN — ACETAMINOPHEN 650 MG: 325 TABLET ORAL at 06:55

## 2023-11-11 ASSESSMENT — PAIN DESCRIPTION - LOCATION: LOCATION: CHEST

## 2023-11-11 ASSESSMENT — COGNITIVE AND FUNCTIONAL STATUS - GENERAL
MOBILITY SCORE: 23
DAILY ACTIVITIY SCORE: 24
CLIMB 3 TO 5 STEPS WITH RAILING: A LITTLE

## 2023-11-11 ASSESSMENT — PAIN DESCRIPTION - ORIENTATION: ORIENTATION: LEFT

## 2023-11-11 ASSESSMENT — PAIN - FUNCTIONAL ASSESSMENT
PAIN_FUNCTIONAL_ASSESSMENT: 0-10

## 2023-11-11 ASSESSMENT — PAIN SCALES - GENERAL
PAINLEVEL_OUTOF10: 0 - NO PAIN
PAINLEVEL_OUTOF10: 3
PAINLEVEL_OUTOF10: 7
PAINLEVEL_OUTOF10: 2

## 2023-11-11 ASSESSMENT — PAIN SCALES - PAIN ASSESSMENT IN ADVANCED DEMENTIA (PAINAD)
FACIALEXPRESSION: SMILING OR INEXPRESSIVE
BODYLANGUAGE: RELAXED

## 2023-11-11 NOTE — CARE PLAN
The patient's goals for the shift include      The clinical goals for the shift include To eat    Over the shift, the patient did not make progress toward the following goals. Barriers to progression include . Recommendations to address these barriers include   Problem: Fall/Injury  Goal: Not fall by end of shift  11/11/2023 0248 by Paula Charles RN  Outcome: Progressing  11/11/2023 0247 by Paula Charles RN  Outcome: Progressing  Goal: Be free from injury by end of the shift  11/11/2023 0248 by Paula Charles RN  Outcome: Progressing  11/11/2023 0247 by Paula Charles RN  Outcome: Progressing  Goal: Verbalize understanding of personal risk factors for fall in the hospital  11/11/2023 0248 by Paula Charles RN  Outcome: Progressing  11/11/2023 0247 by Paula Charles RN  Outcome: Progressing  Goal: Verbalize understanding of risk factor reduction measures to prevent injury from fall in the home  11/11/2023 0248 by Paula Charles RN  Outcome: Progressing  11/11/2023 0247 by Paula Charles RN  Outcome: Progressing  Goal: Use assistive devices by end of the shift  11/11/2023 0248 by Paula Charles RN  Outcome: Progressing  11/11/2023 0247 by Paula Charles RN  Outcome: Progressing  Goal: Pace activities to prevent fatigue by end of the shift  11/11/2023 0248 by Paula Charles RN  Outcome: Progressing  11/11/2023 0247 by Paula Charles RN  Outcome: Progressing     Problem: Pain  Goal: My pain/discomfort is manageable  Outcome: Progressing     Problem: Safety  Goal: Patient will be injury free during hospitalization  Outcome: Progressing  Goal: I will remain free of falls  Outcome: Progressing   .

## 2023-11-11 NOTE — PROGRESS NOTES
Subjective Data:  Patient seen and examined, chart reviewed  -- Seen in the presence of her significant other, permission granted discussed medical care  -- Tolerated PPM/AICD implantation without issue, follow-up chest x-rays have shown no postoperative complications  -- She offers no significant complaints other than relief       Objective Data:  Last Recorded Vitals:  Vitals:    11/11/23 0050 11/11/23 0400 11/11/23 0735 11/11/23 1206   BP:  145/76 156/72 144/77   BP Location:  Left arm Right arm Right arm   Patient Position:  Lying Lying Lying   Pulse:  75 69 72   Resp:  18 18 18   Temp:  36.4 °C (97.5 °F) 36.6 °C (97.9 °F) 36.6 °C (97.9 °F)   TempSrc:  Temporal Temporal Temporal   SpO2: 94% 99%  95%   Weight:  89.3 kg (196 lb 13.9 oz)     Height:           Last Labs:  CBC - 11/11/2023:  6:26 AM  5.0 12.6 86    39.7      CMP - 11/11/2023:  6:26 AM  8.2 6.4 11 --- 0.4   _ 4.2 6 128      PTT - No results in last year.  1.3   15.0 _     BNP   Date/Time Value Ref Range Status   06/02/2023 09:49 AM 50 0 - 99 pg/mL Final     Comment:     .  <100 pg/mL - Heart failure unlikely  100-299 pg/mL - Intermediate probability of acute heart  .               failure exacerbation. Correlate with clinical  .               context and patient history.    >=300 pg/mL - Heart Failure likely. Correlate with clinical  .               context and patient history.   Biotin interference may cause falsely decreased results.   Patients taking a Biotin dose of up to 5 mg/day should   refrain from taking Biotin for 24 hours before sample   collection. Providers may contact their local laboratory   for further information.     11/07/2022 11:27 AM 29 0 - 99 pg/mL Final     Comment:     .  <100 pg/mL - Heart failure unlikely  100-299 pg/mL - Intermediate probability of acute heart  .               failure exacerbation. Correlate with clinical  .               context and patient history.    >=300 pg/mL - Heart Failure likely. Correlate with  "clinical  .               context and patient history.   Biotin interference may cause falsely decreased results.   Patients taking a Biotin dose of up to 5 mg/day should   refrain from taking Biotin for 24 hours before sample   collection. Providers may contact their local laboratory   for further information.       HGBA1C   Date/Time Value Ref Range Status   11/06/2023 03:40 AM 6.1 4.3 - 5.6 % Final     Comment:     American Diabetes Association guidelines indicate that patients with HgbA1c in the range 5.7-6.4% are at increased risk for development of diabetes, and intervention by lifestyle modification may be beneficial. HgbA1c greater or equal to 6.5% is considered diagnostic of diabetes.     VLDL   Date/Time Value Ref Range Status   01/04/2022 12:51 PM 19 0 - 40 mg/dL Final   12/14/2020 03:42 PM 13 0 - 40 mg/dL Final      Last I/O:  I/O last 3 completed shifts:  In: 1905 (21.3 mL/kg) [P.O.:580; I.V.:1325 (14.8 mL/kg)]  Out: 405 (4.5 mL/kg) [Urine:400 (0.1 mL/kg/hr); Blood:5]  Weight: 89.3 kg     Past Cardiology Tests (Last 3 Years):  EKG:  No results found for this or any previous visit from the past 1095 days.    Echo:  No results found for this or any previous visit from the past 1095 days.    Ejection Fractions:  No results found for: \"EF\"  Cath:  No results found for this or any previous visit from the past 1095 days.    Stress Test:  No results found for this or any previous visit from the past 1095 days.    Cardiac Imaging:  No results found for this or any previous visit from the past 1095 days.      Inpatient Medications:  Scheduled medications   Medication Dose Route Frequency    atorvastatin  40 mg oral Nightly    cephalexin  500 mg oral q6h YURI    [Held by provider] enoxaparin  40 mg subcutaneous q24h    furosemide  20 mg oral Daily    melatonin  3 mg oral Daily    pantoprazole  40 mg oral Daily before breakfast    Or    pantoprazole  40 mg intravenous Daily before breakfast    polyethylene glycol  " 17 g oral Daily    sacubitriL-valsartan  1 tablet oral BID     PRN medications   Medication    acetaminophen    Or    acetaminophen    Or    acetaminophen    acetaminophen-codeine    guaiFENesin    morphine    naloxone    ondansetron    Or    ondansetron    traMADol     Continuous Medications   Medication Dose Last Rate    sodium chloride 0.9%  100 mL/hr 100 mL/hr (11/11/23 0956)       Physical Exam:  Patient seen and examined in room 406. She is seated at window chair.   General:  Patient is awake, alert, and oriented.  Patient is in no acute distress.  HEENT:  Pupils equal and reactive.  Normocephalic.  Moist mucosa.    Neck:  No thyromegaly.  Normal Jugular Venous Pressure.  Cardiovascular:  Regular rate and rhythm.  Normal S1 and S2. New PPM is in the Left Subclavicular region. Dressing is C/D/I   Pulmonary:  Clear to auscultation bilaterally.  Abdomen:  Soft. Non-tender.   Non-distended.  Positive bowel sounds.  Lower Extremities:  2+ pedal pulses. No LE edema.  Neurologic:  Cranial nerves intact.  No focal deficit.   Skin: Skin warm and dry, normal skin turgor.   Psychiatric: Normal affect.     Assessment/Plan   Buffy Temple is a 68 y.o. female -American female pertinent cardiac history of permanent atrial fibrillation, chronic systolic heart failure, chronic nonischemic cardiomyopathy, cva, chronic kidney disease and current smoker.  Presented from outside hospital with 10-12 seconds pauses. Cardiology is consulted for sick sinus rhythm.      Sick sinus syndrome- with pauses as long at 12 seconds noted on telemetry at OSH.  Permanent atrial fibrillation- Beta blocker  and amiodarone held in setting of significant pauses. Can resume after PPM implant.  Xarelto prior to procedure.  Resume when okay with EP.   Chronic systolic heart failure- Euvolemic on clinical exam. Resume Entresto 24-26mg BID. Resume BB when safe to do so/device interrogation  post implant.   HTN- Continue amlodipine 2.5 mg  daily   HLD- Continue statin      Recommendations  OK to discharge   Resume Chronic Medications including Metoprolol / Entresto / Xarelto     Follow up with Device Clinic as per Protocol   Follow up with Cardiology in 3-5 weeks post discharge     Peripheral IV 11/10/23 20 G Left;Posterior Hand (Active)   Site Assessment Clean;Dry;Intact 11/11/23 0800   Line Status Infusing 11/11/23 0800   Dressing Status Clean;Dry;Occlusive 11/11/23 0800   Number of days: 1       Code Status:  Full Code    I spent 30 minutes in the professional and overall care of this patient.        Jose J Sands, DO

## 2023-11-11 NOTE — NURSING NOTE
Pt c/o nausea, sitting at side of bed, vomited small amt clear fluid.  Pt also c/o dizziness when sitting at side of bed. Monitor shows atrial paced rhythm 55-60.  Skin is warm and moist.  /84, hr 60, rr 18, pox on room air 91%  O2 2LNC applied and repeat pox 94%  Pt medicated with zofran 4mg iv as ordered.  HOB elevated 30 degrees. IV site asymptomatic.  Reviewed use of call bell and pt instructed to notify nurse of any further pain, sob, nausea/vomiting.  Pt states understanding. Safety and frequent checks maintained.

## 2023-11-11 NOTE — NURSING NOTE
Pt states pain level remains 9/10 to pacer insertion site despite earlier tramadol 50mg and tylenol 650mg po.  Pt medicated at this time with morphine 2mg iv for discomfort.  IV site asymptomatic.  Pacer site with dsg c/d/I.  SCD to bilateral lower legs.  Pt up to br with assist earlier and voiding qs yellow urine.  Safety and frequent checks maintained.  Afib per monitor. VSS.

## 2023-11-11 NOTE — DISCHARGE SUMMARY
Discharge Diagnosis  Sick sinus syndrome (CMS/HCC)    Issues Requiring Follow-Up  PCP    Test Results Pending At Discharge  Pending Labs       No current pending labs.            Hospital Course  Patient admitted to the hospital with sick sinus syndrome  Underwent a successful AICD placement  Pain controlled postprocedure  Ambulating without any difficulty  We will discharge the patient home on a regular home medications  Entresto  Xarelto  Metoprolol  We will follow-up with me in the office    Pertinent Physical Exam At Time of Discharge  Physical Exam    Constitutional   General appearance: Alert and in no acute distress.     Pulmonary   Respiratory assessment: No respiratory distress, normal respiratory rhythm and effort.    Auscultation of Lungs: Clear bilateral breath sounds.   Cardiovascular   Auscultation of heart: Apical pulse normal, heart rate and rhythm normal, normal S1 and S2, no murmurs and no pericardial rub.    Exam for edema: No peripheral edema.   Abdomen   Abdominal Exam: No bruits, normal bowel sounds, soft, non-tender, no abdominal mass palpated.    Liver and Spleen exam: No hepato-splenomegaly.   Musculoskeletal   Examination of gait: Normal.    Inspection of digits and nails: No clubbing or cyanosis of the fingernails.    Inspection/palpation of joints, bones and muscles: No joint swelling. Normal movement of all extremities.   Skin   Skin inspection: Normal skin color and pigmentation, normal skin turgor and no visible rash.   Neurologic   Cranial nerves: Nerves 2-12 were intact, no focal neuro defects.       Home Medications     Medication List      START taking these medications     atorvastatin 40 mg tablet; Commonly known as: Lipitor; Take 1 tablet (40   mg) by mouth once daily at bedtime.   cephalexin 500 mg capsule; Commonly known as: Keflex; Take 1 capsule   (500 mg) by mouth every 6 hours for 25 doses.   rivaroxaban 20 mg tablet; Commonly known as: Xarelto; Take 1 tablet (20   mg) by  mouth once daily. Take with food.   sacubitriL-valsartan 24-26 mg tablet; Commonly known as: Entresto; Take   1 tablet by mouth 2 times a day.   traMADol 50 mg tablet; Commonly known as: Ultram; Take 1 tablet (50 mg)   by mouth every 8 hours if needed for severe pain (7 - 10) for up to 5   days.     CONTINUE taking these medications     amiodarone 200 mg tablet; Commonly known as: Pacerone   cholecalciferol 50 mcg (2,000 unit) capsule; Commonly known as: Vitamin   D-3   furosemide 20 mg tablet; Commonly known as: Lasix   losartan 25 mg tablet; Commonly known as: Cozaar   metoprolol succinate  mg 24 hr tablet; Commonly known as:   Toprol-XL       Outpatient Follow-Up  Future Appointments   Date Time Provider Department Center   12/6/2023 11:15 AM Varun Cloud MD [id number] [facility]     Patient seen at bedside. Events from the last visit reviewed. Discussed with staff. Results of tests and investigations from last visit reviewed and discussed with patient/Family. Electronic chart on [facility] reviewed. Input / Recommendations  from consultants  appreciated and reviewed and agreed with.     discharge summary and profile completed. medications reviewed and discussed with patient and family.  scripts completed and signed.     total discharge time in excess of 30 minutes.    Varun Cloud MD

## 2023-11-13 ENCOUNTER — PATIENT OUTREACH (OUTPATIENT)
Dept: CARE COORDINATION | Facility: CLINIC | Age: 68
End: 2023-11-13
Payer: MEDICARE

## 2023-11-13 ENCOUNTER — HOSPITAL ENCOUNTER (OUTPATIENT)
Dept: CARDIOLOGY | Facility: CLINIC | Age: 68
Discharge: HOME | End: 2023-11-13
Payer: MEDICARE

## 2023-11-13 DIAGNOSIS — R55 SYNCOPE AND COLLAPSE: ICD-10-CM

## 2023-11-13 NOTE — PROGRESS NOTES
Discharge Facility:[facility]  Discharge Diagnosis:Sick Sinus Syndrome  Admission Date:11/10/23  Discharge Date: 11/11/23    PCP Appointment Date:12/06/23  Specialist Appointment Date:   Hospital Encounter and Summary: Linked   See discharge assessment below for further details  Engagement  Call Start Time: 0827 (11/13/2023  8:27 AM)    Medications  Medications reviewed with patient/caregiver?: Yes (new meds atrovastation 40mg cephaxin 500mg xalerto 20 mg ttramadol 50 mg) (11/13/2023  8:27 AM)  Is the patient having any side effects they believe may be caused by any medication additions or changes?: No (11/13/2023  8:27 AM)  Does the patient have all medications ordered at discharge?: Yes (11/13/2023  8:27 AM)  Is the patient taking all medications as directed (includes completed medication regime)?: Yes (11/13/2023  8:27 AM)    Appointments  Does the patient have a primary care provider?: Yes (11/13/2023  8:27 AM)  Care Management Interventions: Verified appointment date/time/provider (11/13/2023  8:27 AM)    Self Management  Has home health visited the patient within 72 hours of discharge?: Not applicable (11/13/2023  8:27 AM)  Has all Durable Medical Equipment (DME) been delivered?: No (11/13/2023  8:27 AM)    Patient Teaching  Does the patient have access to their discharge instructions?: Yes (11/13/2023  8:27 AM)  Care Management Interventions: Reviewed instructions with patient (11/13/2023  8:27 AM)  What is the patient's perception of their health status since discharge?: Improving (11/13/2023  8:27 AM)    Wrap Up  Call End Time: 0835 (11/13/2023  8:27 AM)

## 2023-11-15 ENCOUNTER — HOSPITAL ENCOUNTER (OUTPATIENT)
Dept: CARDIOLOGY | Facility: HOSPITAL | Age: 68
Discharge: HOME | End: 2023-11-15
Payer: MEDICARE

## 2023-11-15 LAB
ATRIAL RATE: 73 BPM
P AXIS: 42 DEGREES
P OFFSET: 196 MS
P ONSET: 125 MS
PR INTERVAL: 196 MS
Q ONSET: 223 MS
QRS COUNT: 13 BEATS
QRS DURATION: 94 MS
QT INTERVAL: 404 MS
QTC CALCULATION(BAZETT): 445 MS
QTC FREDERICIA: 431 MS
R AXIS: 24 DEGREES
T AXIS: 111 DEGREES
T OFFSET: 425 MS
VENTRICULAR RATE: 73 BPM

## 2023-11-15 PROCEDURE — 93005 ELECTROCARDIOGRAM TRACING: CPT

## 2023-11-21 ENCOUNTER — PATIENT OUTREACH (OUTPATIENT)
Dept: CARE COORDINATION | Facility: CLINIC | Age: 68
End: 2023-11-21
Payer: MEDICARE

## 2023-11-21 NOTE — PROGRESS NOTES
Unable to reach patient for call back after patient's follow up appointment with PCP.   CURTISM with call back number for patient to call if needed   If no voicemail available call attempts x 2 were made to contact the patient to assist with any questions or concerns patient may have.

## 2023-11-29 PROBLEM — I63.9 EMBOLIC STROKE (MULTI): Status: ACTIVE | Noted: 2023-11-29

## 2023-11-29 PROBLEM — I46.9: Status: ACTIVE | Noted: 2023-11-29

## 2023-11-29 PROBLEM — J44.9 COPD (CHRONIC OBSTRUCTIVE PULMONARY DISEASE) (MULTI): Status: ACTIVE | Noted: 2023-11-29

## 2023-11-29 PROBLEM — E05.90 HYPERTHYROIDISM: Status: ACTIVE | Noted: 2023-11-07

## 2023-11-29 PROBLEM — F17.210 CIGARETTE NICOTINE DEPENDENCE, UNCOMPLICATED: Status: ACTIVE | Noted: 2023-11-29

## 2023-11-29 PROBLEM — E78.5 HLD (HYPERLIPIDEMIA): Status: ACTIVE | Noted: 2023-11-29

## 2023-11-29 PROBLEM — K80.20 CHOLELITHIASIS: Status: ACTIVE | Noted: 2023-11-29

## 2023-11-29 PROBLEM — I10 HYPERTENSION: Status: ACTIVE | Noted: 2023-11-29

## 2023-11-29 PROBLEM — I48.92 ATRIAL FLUTTER (MULTI): Status: ACTIVE | Noted: 2023-11-07

## 2023-11-29 PROBLEM — I48.91 ATRIAL FIBRILLATION (MULTI): Status: ACTIVE | Noted: 2023-11-06

## 2023-11-29 PROBLEM — I50.22 CHRONIC SYSTOLIC CONGESTIVE HEART FAILURE (MULTI): Status: ACTIVE | Noted: 2023-11-29

## 2023-11-30 ENCOUNTER — OFFICE VISIT (OUTPATIENT)
Dept: CARDIOLOGY | Facility: CLINIC | Age: 68
End: 2023-11-30
Payer: MEDICARE

## 2023-11-30 VITALS
HEART RATE: 57 BPM | SYSTOLIC BLOOD PRESSURE: 132 MMHG | HEIGHT: 69 IN | WEIGHT: 185 LBS | DIASTOLIC BLOOD PRESSURE: 80 MMHG | OXYGEN SATURATION: 94 % | BODY MASS INDEX: 27.4 KG/M2

## 2023-11-30 DIAGNOSIS — I50.22 CHRONIC SYSTOLIC CONGESTIVE HEART FAILURE (MULTI): ICD-10-CM

## 2023-11-30 DIAGNOSIS — I46.9: ICD-10-CM

## 2023-11-30 DIAGNOSIS — Z95.810 PRESENCE OF AUTOMATIC (IMPLANTABLE) CARDIAC DEFIBRILLATOR: ICD-10-CM

## 2023-11-30 DIAGNOSIS — I42.9 CARDIOMYOPATHY (MULTI): ICD-10-CM

## 2023-11-30 DIAGNOSIS — I10 PRIMARY HYPERTENSION: ICD-10-CM

## 2023-11-30 DIAGNOSIS — I48.11 LONGSTANDING PERSISTENT ATRIAL FIBRILLATION (MULTI): Primary | ICD-10-CM

## 2023-11-30 DIAGNOSIS — I49.5 SICK SINUS SYNDROME (MULTI): ICD-10-CM

## 2023-11-30 LAB
ATRIAL RATE: 57 BPM
P AXIS: 40 DEGREES
P OFFSET: 203 MS
P ONSET: 131 MS
PR INTERVAL: 188 MS
Q ONSET: 225 MS
QRS COUNT: 10 BEATS
QRS DURATION: 94 MS
QT INTERVAL: 440 MS
QTC CALCULATION(BAZETT): 428 MS
QTC FREDERICIA: 432 MS
R AXIS: 51 DEGREES
T AXIS: 67 DEGREES
T OFFSET: 445 MS
VENTRICULAR RATE: 57 BPM

## 2023-11-30 PROCEDURE — 1111F DSCHRG MED/CURRENT MED MERGE: CPT | Performed by: INTERNAL MEDICINE

## 2023-11-30 PROCEDURE — 99215 OFFICE O/P EST HI 40 MIN: CPT | Performed by: INTERNAL MEDICINE

## 2023-11-30 PROCEDURE — 4004F PT TOBACCO SCREEN RCVD TLK: CPT | Performed by: INTERNAL MEDICINE

## 2023-11-30 PROCEDURE — 1159F MED LIST DOCD IN RCRD: CPT | Performed by: INTERNAL MEDICINE

## 2023-11-30 PROCEDURE — 1125F AMNT PAIN NOTED PAIN PRSNT: CPT | Performed by: INTERNAL MEDICINE

## 2023-11-30 PROCEDURE — 93010 ELECTROCARDIOGRAM REPORT: CPT | Performed by: INTERNAL MEDICINE

## 2023-11-30 PROCEDURE — 3079F DIAST BP 80-89 MM HG: CPT | Performed by: INTERNAL MEDICINE

## 2023-11-30 PROCEDURE — 99215 OFFICE O/P EST HI 40 MIN: CPT | Mod: 25 | Performed by: INTERNAL MEDICINE

## 2023-11-30 PROCEDURE — 3075F SYST BP GE 130 - 139MM HG: CPT | Performed by: INTERNAL MEDICINE

## 2023-11-30 PROCEDURE — 93005 ELECTROCARDIOGRAM TRACING: CPT | Performed by: INTERNAL MEDICINE

## 2023-11-30 RX ORDER — ATORVASTATIN CALCIUM 40 MG/1
40 TABLET, FILM COATED ORAL NIGHTLY
Qty: 30 TABLET | Refills: 1 | Status: SHIPPED | OUTPATIENT
Start: 2023-11-30 | End: 2024-01-22 | Stop reason: SDUPTHER

## 2023-11-30 RX ORDER — FUROSEMIDE 20 MG/1
TABLET ORAL
Qty: 108 TABLET | Refills: 3 | Status: SHIPPED | OUTPATIENT
Start: 2023-11-30 | End: 2024-01-22 | Stop reason: SDUPTHER

## 2023-11-30 RX ORDER — METOPROLOL SUCCINATE 100 MG/1
100 TABLET, EXTENDED RELEASE ORAL DAILY
Qty: 90 TABLET | Refills: 3 | Status: SHIPPED | OUTPATIENT
Start: 2023-11-30 | End: 2023-12-10

## 2023-11-30 RX ORDER — NAPROXEN SODIUM 220 MG
220 TABLET ORAL AS NEEDED
COMMUNITY

## 2023-11-30 ASSESSMENT — ENCOUNTER SYMPTOMS
DEPRESSION: 0
OCCASIONAL FEELINGS OF UNSTEADINESS: 0
LOSS OF SENSATION IN FEET: 0

## 2023-11-30 ASSESSMENT — PAIN SCALES - GENERAL: PAINLEVEL: 4

## 2023-11-30 NOTE — PROGRESS NOTES
Chief Complaint:   Follow-up (2 week )     History Of Present Illness:    Buffy Temple is a 68 y.o. female presenting with  pertinent cardiac history of permanent atrial fibrillation, chronic systolic heart failure, chronic nonischemic cardiomyopathy, who is here to follow-up with cardiology for continued management of Permanent Atrial Fibrillation , Chronic Systolic Heart Failure     Pertinent Cardiology Hx   Hx of PEA Cardiac Arrest 1/31/2020 -- Witnessed event in ER ; Occurring in the setting of possible septic shock / Atrial Fibrillation with RVR     She was on Sotalol 40 mg BID and Metoprolol Succinate 100 mg home dose. She had apparently received 100 mg PO Metoprolol Succinate for a reported missed dose +and then was placed on Diltiazem 20 mg Push + GTT for Atrial Fibrillation with RVR and was receiving Azithromycin / Ceftriaxone at the time of her event for presumed Community Acquired Bacterial Pneumonia. She was also exceptionally acidotic with ABG of 7.05/54/59 and elevated Lactic Acid of 3.6. She was admitted to ICU, Diltiazem Gtt Discontinued. Sotalol was held due to renal issues as well as the circumstances of her PEA arrest. Medications were adjusted to reflect her drop in her LVEF. At time of discharge, she was to have been referred back to Dr Ashraf for management of her Persistent Atrial Fibrillation.     Medications as reconciled at time of discharge were   -- Aldactone 25 mg oral tablet - 1 tab(s) orally 2 times a day ( Stopped )   -- Losartan 25 mg oral tablet - 1 tab(s) orally once a day  -- isosorbide dinitrate 10 mg oral tablet - 1 tab(s) orally 3 times a day ( Stopped )   -- Metoprolol Tartrate 25 mg oral tablet - 1 tab(s) orally every 12 hours  -- Lasix 40 mg oral tablet - 1 tab(s) orally once a day ( Stopped)   -- Digoxin 125 mcg (0.125 mg) oral tablet - 1 tab(s) orally once a day ( Stopped )  -- Rivaroxaban 20 mg oral tablet - 1 tab(s) orally once a day (in the evening)      Persistent Atrial Fibrillation -- CHADS-VASC = 6  First noted In 2013. She presented with atrial fibrillation and cardiomyopathy secondary to tachycardia. She failed cardioversion. She was put on amiodarone and converted to sinus rhythm and subsequently underwent PVAI in January 2014. She had one episode of paroxsymal A. fib in March 2014. Subsequently on telemetry. Remote monitoring showed normal sinus rhythm. Her oral anticoagulation was discontinued. In 2015 she had a stroke involving the distal  left MCA territory. She was re-started on oral anticoagulation and underwent re-do PVAI following failed Dofetilide loading in September 2017. She maintained NSR until 7/2019 at which time she was started on Sotalol and then underwent DCCV 8/2019 with restoration of Sinus Rhythm. This was apparently maintained until January 2020 when she had her PEA arrest. Her 14 day event monitor in March 2020 revealed about 47% atrial fibrillation burden of which she is asymptomatic.      She is seen today for routine follow-up. Overall, she feels that she is doing very well. She notes that she has still has atrial fibrillation, but she notes that she does not feel it. She is still smoking, but is down to a quarter to half pack daily. Price is becoming an issue, which is prompting her to consider abstinence. She does have a prescription for Chantix but has not started it. She was recently started on simvastatin 20 by her PCP. She is tolerating at this time.     ( 11/7/2022) She returns for scheduled followup. She has been able to secure laboratory studies. However, she has been compliant with all medications. She has been able to do all of her daily activities without undue compromise. She denies any chest pressure, pain, palpitations, shortness of breath. We discussed her EKG, she is somewhat surprised if he that she is back in atrial fibrillation     Today ( 11/18/2022) she returns for follow-up of atrial fibrillation. She has  tolerated increased doses of beta-blockers. She has not noticed any lightheadedness, dizziness. She feels that many of her symptoms have improved. She remains surprised that she is still in atrial fibrillation. We discussed cardioversion. At this time, she does not wish to proceed with this nor does she wish to try any new medications.     Today ( 2/28/2023) She returns for follow up. SHe had some confusion about follow up. She has been trying to lose weight by changing her diet, but notes that she drinks a lot of creamer in her coffee and she contines to smoke when she is drinking her coffee. Denies Bleeding, bruising complications.      6/12/2023  -- She returns for follow up of echocardiogram and testing. She reports that she has continued to be in her usual state of health. She denies any increased heart palpitations, shortness of breath. She does however continue to smoke, currently heavily of cigarette smoke. She reports that she has been compliant with medications.        9/11/2023  -- She returns for follow up. She had difficulty with her cardioversion scheduling. Further, she was very confused about medications. She apparently stopped taking metoprolol and instead continued on losartan with the addition of Entresto. She continues to smoke, at least a pack per day. She denies any other complaints.       11/30/2023 -- She returns for follow up. Since she was last seen she was admitted to [facility] where she was noted to have Tachy-nikki and had 12 second post conversion pauses,        Patient denies chest pain and angina. Pt denies shortness of breath, dyspnea on exertion, orthopnea, and paroxysmal nocturnal dyspnea. Pt denies worsening lower extremity edema. Pt denies palpitations or syncope. No recent falls. No fever or chills. No cough. No change in bowel or bladder habits. No travel. No sick contacts. No recent travel     12 point review of systems was performed and is otherwise negative.  Past  "medical history: As above.  Medications: Reviewed.  Allergies: Reviewed.  Social history: Patient denies alcohol abuse, or illicit drug use. She unfortunately still smokes. She has not tried Chantix.  Family history: No sudden cardiac death or premature coronary artery disease.   .     Last Recorded Vitals:  Vitals:    11/30/23 0843   BP: 132/80   Patient Position: Sitting   Pulse: 57   SpO2: 94%   Weight: 83.9 kg (185 lb)   Height: 1.753 m (5' 9\")       Past Medical History:  She has a past medical history of Essential (primary) hypertension (11/18/2022), Other conditions influencing health status (08/18/2013), Personal history of other endocrine, nutritional and metabolic disease, and Personal history of other specified conditions.    Past Surgical History:  She has a past surgical history that includes Other surgical history (08/23/2013); MR angio head wo IV contrast (9/13/2015); MR angio neck w IV contrast (9/13/2015); Cardiac electrophysiology procedure (Left, 11/10/2023); and Cardiac electrophysiology procedure (Left, 11/10/2023).      Social History:  She reports that she has been smoking cigarettes. She has never used smokeless tobacco. She reports that she does not currently use alcohol. She reports that she does not currently use drugs.    Family History:  No family history on file.     Allergies:  Patient has no known allergies.    Outpatient Medications:  Current Outpatient Medications   Medication Instructions    amiodarone (PACERONE) 200 mg, oral, Every 24 hours    atorvastatin (LIPITOR) 40 mg, oral, Nightly    cholecalciferol (Vitamin D-3) 50 mcg (2,000 unit) capsule 1 capsule, oral, Daily    furosemide (Lasix) 20 mg tablet Take 1 tablet (20 mg) by mouth once a day on Sunday, Monday, Wednesday, Friday, and Saturday AND 2 tablets (40 mg) once a day on Tuesday and Thursday.    metoprolol succinate XL (TOPROL-XL) 100 mg, oral, Every 12 hours    naproxen sodium (ALEVE) 220 mg, oral, As needed    " "rivaroxaban (XARELTO) 20 mg, oral, Daily, Take with food.    sacubitriL-valsartan (Entresto) 24-26 mg tablet 1 tablet, oral, 2 times daily       Physical Exam:  /80 (Patient Position: Sitting)   Pulse 57   Ht 1.753 m (5' 9\")   Wt 83.9 kg (185 lb)   SpO2 94%   BMI 27.32 kg/m²     General Appearance:  Alert, cooperative, no distress, appears stated age   Head:  Normocephalic, without obvious abnormality, atraumatic   Eyes:  PERRL, conjunctiva/corneas clear, EOM's intact, fundi benign, both eyes   Ears:  Normal TM's and external ear canals, both ears   Nose: Nares normal, septum midline,mucosa normal, no drainage or sinus tenderness   Throat: Lips, mucosa, and tongue normal; teeth and gums normal   Neck: Supple, symmetrical, trachea midline, no adenopathy;  thyroid: not enlarged, symmetric, no tenderness/mass/nodules; no carotid bruit or JVD   Back:   Symmetric, no curvature, ROM normal, no CVA tenderness   Lungs:   Clear to auscultation bilaterally, respirations unlabored   Heart:  Regular rate and rhythm, S1 and S2 normal, no murmur, rub, or gallop   Abdomen:   Soft, non-tender, bowel sounds active all four quadrants,  no masses, no organomegaly   Extremities: Extremities normal, atraumatic, no cyanosis or edema   Pulses: 2+ and symmetric   Skin: Skin color, texture, turgor normal, no rashes or lesions   Lymph nodes: Cervical, supraclavicular, and axillary nodes normal   Neurologic: Normal          Last Labs:  CBC -  Lab Results   Component Value Date    WBC 4.2 (L) 12/18/2023    HGB 11.9 (L) 12/18/2023    HCT 39.1 12/18/2023    MCV 99 12/18/2023    PLT 93 (L) 12/18/2023       CMP -  Lab Results   Component Value Date    CALCIUM 9.0 12/18/2023    PHOS 2.9 11/07/2022    PROT 5.9 (L) 12/18/2023    ALBUMIN 3.9 12/18/2023    AST 12 12/18/2023    ALT 7 12/18/2023    ALKPHOS 118 12/18/2023    BILITOT 0.4 12/18/2023       LIPID PANEL -   Lab Results   Component Value Date    CHOL 170 06/02/2023    TRIG 64 " 06/02/2023    HDL 38.8 (A) 06/02/2023    CHHDL 4.4 06/02/2023    LDLF 123 (H) 01/04/2022    VLDL 19 01/04/2022       RENAL FUNCTION PANEL -   Lab Results   Component Value Date    GLUCOSE 83 12/18/2023     12/18/2023    K 5.2 12/18/2023     (H) 12/18/2023    CO2 25 12/18/2023    ANIONGAP 13 12/18/2023    BUN 42 (H) 12/18/2023    CREATININE 2.38 (H) 12/18/2023    CALCIUM 9.0 12/18/2023    PHOS 2.9 11/07/2022    ALBUMIN 3.9 12/18/2023        Lab Results   Component Value Date    BNP 50 06/02/2023    HGBA1C 6.1 (H) 11/06/2023       Last Cardiology Tests:  ECG:  ECG 12 lead tomorrow at 8 AM 11/15/2023  In office EKG 9/11/2023  -- Rapid atrial flutter at 2-1 conduction     In office EKG 6/12/2023  -- Rate controlled atrial flutter  In office EKG 02/28/2022 3  --- Atrial fibrillation/flutter with controlled ventricular rates     In office EKG 11/18/2022  -- Atrial fibrillation with controlled rates ` 90      In office EKG 11/7/2022   -- Atrial fibrillation with RVR, Rates ~ 118     Event Monitor 11/2021  -- [employer]   --Predominant rhythm sinus bradycardia to sinus tachycardia with occurrences of atrial fibrillation with RVR  --Minimum heart rate 52 beats, maximum heart rate 172 beats, average heart rate 80 beats  --Single episode nonsustained ventricular tachycardia of 9 beats  --15,679 paroxysmal supraventricular contractions with 1% burden 144 occurrences of SVT, longest episode 11 beats  --Atrial fibrillation burden 52.81%, longest sustained episode 2 days 21 hours.    IN Office EKG 11/17/2021  -- Atrial FIbrillation with Controlled VR    Last Event Monitor 03/2020   47% Atrial Fibrillation [place]   Avg Rates ~ 75-89  Atrial Fibrillation Ranges ~ 104-173  Sinus Rhythm Rnages ~ 71-86     Interoffice EKG   -- 07/2020  -- [place] Sinus Rhythm  -- Normal Intervals ( Qtc 420 )   -- TWI in ( V4-V6)      Echo:  Echocardiogram 06/2023  1. Left ventricular systolic function is mildly to moderately decreased  with a 35-40% estimated ejection fraction.  2. There is global hypokinesis of the left ventricle with minor regional variations.  3. Spectral Doppler shows an impaired relaxation pattern of left ventricular diastolic filling.  4. Increased LV mass.  5. There is moderate concentric left ventricular hypertrophy.  6. The left ventricular posterior wall thickness is moderately increased.  7. There is mildly reduced right ventricular systolic function.  8. The left atrium is moderate to severely dilated.  9. Moderate mitral valve regurgitation.  10. Compared with study from 12/9/2021, there is a significant reduction in LVEF from 55-60% to 35-40%.    Echocardiogram 12/2021  1. The left ventricular systolic function is normal with a 55-60% estimated ejection fraction.  2. The left atrium is severely dilated.  3. Interval improvement in Mitral Regurgitation Volume from 17.60 ml to 15.91 ml.  4. RVSP within normal limits    Echocardiogram 07/2020  1. The left ventricular systolic function is low normal with a 50-55% estimated ejection fraction.  2. There is mild to moderate eccentric left ventricular hypertrophy.  3. The left atrium is severely dilated.  4. Mild to moderate mitral valve regurgitation.  5. The patient is in atrial fibrillation which may influence the estimate of left ventricular function and transvalvular flows.     Echocardiogram 01/2020 ( following PEA Arrest / Septic Shock )   1. The left ventricular systolic function is severely decreased with a 15-20% estimated ejection fraction.  2. There is global hypokinesis of the left ventricle with minor regional variations.  3. The patient is in atrial fibrillation which may influence the estimate of left ventricular function and transvalvular flows.  4. The left ventricular cavity size is moderately dilated.  5. There is mild to moderate eccentric left ventricular hypertrophy. Increased LV mass.  6. The left atrium is severely dilated.  7. Moderate mitral valve  regurgitation.  8. There is mild to moderate tricuspid regurgitation.  9. There is a small pericardial effusion. There is no evidence of cardiac         Lab review: I have personally reviewed the laboratory result(s)   Diagnostic review: I have personally reviewed the result(s) of the EKG, Echocardiogram, and Holter Monitor .   Imaging review: I have  personally reviewed the result(s) Device interrogations     Assessment/Plan     Problem List Items Addressed This Visit       Sick sinus syndrome (CMS/HCC)    Relevant Medications    atorvastatin (Lipitor) 40 mg tablet    sacubitriL-valsartan (Entresto) 24-26 mg tablet    Other Relevant Orders    ECG 12 lead (Clinic Performed) (Completed)    Cardiomyopathy (CMS/HCC)    Relevant Medications    atorvastatin (Lipitor) 40 mg tablet    sacubitriL-valsartan (Entresto) 24-26 mg tablet    Other Relevant Orders    ECG 12 lead (Clinic Performed) (Completed)    Presence of automatic (implantable) cardiac defibrillator    Overview     Device -          Relevant Orders    ECG 12 lead (Clinic Performed) (Completed)    Cardiac arrest with pulseless electrical activity (CMS/[facility])    Overview     Hx of PEA Cardiac Arrest 1/31/2020 -- Witnessed event in ER ; Occurring in the setting of possible septic shock / Atrial Fibrillation with RVR     She was on Sotalol 40 mg BID and Metoprolol Succinate 100 mg home dose. She had apparently received 100 mg PO Metoprolol Succinate for a reported missed dose +and then was placed on Diltiazem 20 mg Push + GTT for Atrial Fibrillation with RVR and was receiving Azithromycin / Ceftriaxone at the time of her event for presumed Community Acquired Bacterial Pneumonia. She was also exceptionally acidotic with ABG of 7.05/54/59 and elevated Lactic Acid of 3.6. She was admitted to ICU, Diltiazem Gtt Discontinued. Sotalol was held due to renal issues as well as the circumstances of her PEA arrest. Medications were adjusted to reflect her drop in her LVEF. At  time of discharge, she was to have been referred back to Dr Ashraf for management of her Persistent Atrial Fibrillation.         Current Assessment & Plan     Remains stable.   She now has [employer] [address], [id number] ICD in pace          Relevant Medications    sacubitriL-valsartan (Entresto) 24-26 mg tablet    Other Relevant Orders    ECG 12 lead (Clinic Performed) (Completed)    Atrial fibrillation (CMS/HCC) - Primary    Overview     Persistent Atrial Fibrillation -- CHADS-VASC = 6  First noted In 2013. She presented with atrial fibrillation and cardiomyopathy secondary to tachycardia. She failed cardioversion. She was put on amiodarone and converted to sinus rhythm and subsequently underwent PVAI in January 2014. She had one episode of paroxsymal A. fib in March 2014. Subsequently on telemetry. Remote monitoring showed normal sinus rhythm. Her oral anticoagulation was discontinued. In 2015 she had a stroke involving the distal  left MCA territory. She was re-started on oral anticoagulation and underwent re-do PVAI following failed Dofetilide loading in September 2017. She maintained NSR until 7/2019 at which time she was started on Sotalol and then underwent DCCV 8/2019 with restoration of Sinus Rhythm. This was apparently maintained until January 2020 when she had her PEA arrest. Her 14 day event monitor in March 2020 revealed about 47% atrial fibrillation burden of which she is asymptomatic.         Current Assessment & Plan     Loaded on Amiodarone for Cardioversion   -- Had 12 Second Post Conversion Pauses   -- AICD implanted 11/10/2023   -- [employer] [address], [id number]           Relevant Medications    sacubitriL-valsartan (Entresto) 24-26 mg tablet    Other Relevant Orders    ECG 12 lead (Clinic Performed) (Completed)    Chronic systolic congestive heart failure (CMS/HCC)    Relevant Medications    furosemide (Lasix) 20 mg tablet    sacubitriL-valsartan (Entresto) 24-26 mg tablet    Hypertension     Relevant Medications    furosemide (Lasix) 20 mg tablet         Jose J Sands DO

## 2023-11-30 NOTE — PATIENT INSTRUCTIONS
"It was a pleasure seeing you today. I would like to see you back in clinic in  3  months. You can call my office if questions arise between now and our next visit.     Today, we talked about your pacemaker and your heart     -- Please Continue ON the Amiodarone at 200 mg Daily. You still have a lot of Atrial Fibrillation [place]    -- Continue on Entresto at 24-26 mg Twice Daily . We will likely try and increase this dose over the next little bit (Probably our next appointment )     -- Continue on Metoprolol Succinate 100 mg Daily     -- Increase your Furosemide to 20 mg DAILY.. I have written your prescription to allow some extra doses if you need to take them.         Please stop smoking.    --Try to cut back on the number of cigarettes that you smoke.    -- Try to increase the interval between cigarettes.   -- Try to use substitutes such as sugar-free candy carrots,celery sticks as in between.  --  Once you are down to less than half a pack a day try to go cold turkey.   -- Try to designate areas in the your home as smoke-free areas.   -- Try to reduce the number of ashtrays and other reminders of smoking.   -- Try to keep any major something that you dislike next to your cigarette pack to try to develop a mental aversion to smoking          Below are some Heart Health Tips that we provide to all of our patients. I hope you fing them useful.     - If you are having problems with medications, consider looking at the following websites.   --  \"GoodRx\"   --  \"[facility]\"      - We are happy to supply written prescriptions if needed to allow you to obtain your medications from different pharmacies. Additionally, if you are having issues with mail order delivery, please let us know. We can send a limited supply of your medications to your local pharmacy.     -  We recommend you follow a heart healthy diet. Watch food labels and try not to eat more than 2,500 mg of sodium per day. Avoid foods high " in salt like processed meats (lunch meats, stevens, and sausage), processed foods (boxed dinners, canned soups), fried and fast foods. Monitor serving sizes and if the sodium per serving size is more than 200 mg, avoid those foods. If the sodium per serving size is between 100-200 mg, you can use those in limited quantities. Try to choose foods where the amount of sodium per serving size is less than 100 mg. Try to eat a diet rich in fruits and vegetables, whole grains, low fat dairy products, skinless poultry and fish, nuts, beans, non-tropical vegetable oils. Limit saturated fat, trans fat, sodium, red meats, and sugar-sweetened beverages.   Limit alcohol     -The combination of a reduced-calorie diet and increased physical activity is recommended. Adults should aim to get at least 150 minutes of moderate physical activity per week (30 minutes of moderate physical activities at least 5 days per week). Examples of moderate physical activities include brisk walking, swimming, aerobic dancing, heavy gardening, jumping rope, bicycling 10 MPH or faster, tennis, hiking uphill or with a heavy backpack. Please let us know if you would like to learn more about your nutrition and calories and additional options including weight loss programs to help you reach your goal.     -If you smoke, stop smoking. If you stop smoking you can help get rid of a major source of stress to your heart. Smoking makes your heart rate and blood pressure go up and increases your risk or developing cardiovascular diseases and worsen symptoms associated with heart failure.     -Obtain a BP monitor and monitor your BP daily. Check it around the same time each day; at least 1 hour after taking your medications. Record your BP in a log and bring your log with you to your doctors appointment.     -F/u with your PCP as recommended. ;in

## 2023-12-06 ENCOUNTER — APPOINTMENT (OUTPATIENT)
Dept: PRIMARY CARE | Facility: CLINIC | Age: 68
End: 2023-12-06
Payer: MEDICARE

## 2023-12-07 DIAGNOSIS — I48.11 LONGSTANDING PERSISTENT ATRIAL FIBRILLATION (MULTI): ICD-10-CM

## 2023-12-07 DIAGNOSIS — I42.9 CARDIOMYOPATHY (MULTI): ICD-10-CM

## 2023-12-10 RX ORDER — METOPROLOL SUCCINATE 100 MG/1
100 TABLET, EXTENDED RELEASE ORAL EVERY 12 HOURS
Qty: 180 TABLET | Refills: 3 | Status: SHIPPED | OUTPATIENT
Start: 2023-12-10 | End: 2024-01-22 | Stop reason: SDUPTHER

## 2023-12-11 ENCOUNTER — PATIENT OUTREACH (OUTPATIENT)
Dept: CARE COORDINATION | Facility: CLINIC | Age: 68
End: 2023-12-11
Payer: MEDICARE

## 2023-12-11 DIAGNOSIS — Z95.810 CARDIAC DEFIBRILLATOR IN PLACE: Primary | ICD-10-CM

## 2023-12-11 DIAGNOSIS — I42.9 CARDIOMYOPATHY, UNSPECIFIED TYPE (MULTI): ICD-10-CM

## 2023-12-11 NOTE — PROGRESS NOTES
Call placed regarding one month post discharge follow up call.  At time of outreach call the patient feels as if their condition has improved since initial visit with PCP or specialist.  Questions or concerns regarding recovery period addressed at this time. (Individualize as needed if questions arise)  Reviewed any PCP or specialists progress notes/labs/radiology reports if applicable and addressed any questions or concerns.

## 2023-12-15 ENCOUNTER — HOSPITAL ENCOUNTER (OUTPATIENT)
Dept: CARDIOLOGY | Facility: CLINIC | Age: 68
Discharge: HOME | End: 2023-12-15
Payer: MEDICARE

## 2023-12-15 DIAGNOSIS — Z95.810 CARDIAC DEFIBRILLATOR IN PLACE: ICD-10-CM

## 2023-12-15 DIAGNOSIS — I42.9 CARDIOMYOPATHY, UNSPECIFIED TYPE (MULTI): ICD-10-CM

## 2023-12-15 DIAGNOSIS — I49.5 SICK SINUS SYNDROME (MULTI): ICD-10-CM

## 2023-12-15 DIAGNOSIS — I49.5 SSS (SICK SINUS SYNDROME) (MULTI): ICD-10-CM

## 2023-12-15 PROCEDURE — 93280 PM DEVICE PROGR EVAL DUAL: CPT | Performed by: NURSE PRACTITIONER

## 2023-12-15 PROCEDURE — 93290 INTERROG DEV EVAL ICPMS IP: CPT | Performed by: NURSE PRACTITIONER

## 2023-12-15 PROCEDURE — 93280 PM DEVICE PROGR EVAL DUAL: CPT

## 2023-12-18 ENCOUNTER — OFFICE VISIT (OUTPATIENT)
Dept: PRIMARY CARE | Facility: CLINIC | Age: 68
End: 2023-12-18
Payer: MEDICARE

## 2023-12-18 ENCOUNTER — LAB (OUTPATIENT)
Dept: LAB | Facility: LAB | Age: 68
End: 2023-12-18
Payer: MEDICARE

## 2023-12-18 VITALS
TEMPERATURE: 98.4 F | WEIGHT: 184.6 LBS | DIASTOLIC BLOOD PRESSURE: 60 MMHG | BODY MASS INDEX: 27.26 KG/M2 | RESPIRATION RATE: 16 BRPM | SYSTOLIC BLOOD PRESSURE: 120 MMHG | HEART RATE: 64 BPM

## 2023-12-18 DIAGNOSIS — E78.49 OTHER HYPERLIPIDEMIA: ICD-10-CM

## 2023-12-18 DIAGNOSIS — I10 PRIMARY HYPERTENSION: ICD-10-CM

## 2023-12-18 DIAGNOSIS — E05.90 HYPERTHYROIDISM: Primary | ICD-10-CM

## 2023-12-18 DIAGNOSIS — Z12.31 SCREENING MAMMOGRAM, ENCOUNTER FOR: ICD-10-CM

## 2023-12-18 DIAGNOSIS — I49.5 SICK SINUS SYNDROME (MULTI): ICD-10-CM

## 2023-12-18 DIAGNOSIS — Z28.21 VACCINATION REFUSED BY PATIENT: Primary | ICD-10-CM

## 2023-12-18 DIAGNOSIS — I50.22 CHRONIC SYSTOLIC CONGESTIVE HEART FAILURE (MULTI): ICD-10-CM

## 2023-12-18 DIAGNOSIS — I42.9 CARDIOMYOPATHY (MULTI): ICD-10-CM

## 2023-12-18 DIAGNOSIS — I48.11 LONGSTANDING PERSISTENT ATRIAL FIBRILLATION (MULTI): ICD-10-CM

## 2023-12-18 PROBLEM — M17.12 LEFT KNEE DJD: Status: ACTIVE | Noted: 2023-12-18

## 2023-12-18 PROBLEM — L30.9 DERMATITIS, ECZEMATOID: Status: ACTIVE | Noted: 2023-12-18

## 2023-12-18 PROBLEM — N18.31 STAGE 3A CHRONIC KIDNEY DISEASE (MULTI): Status: ACTIVE | Noted: 2023-12-18

## 2023-12-18 PROBLEM — H01.006 BLEPHARITIS, BILATERAL: Status: ACTIVE | Noted: 2023-12-18

## 2023-12-18 PROBLEM — R47.01 APHASIA: Status: ACTIVE | Noted: 2023-12-18

## 2023-12-18 PROBLEM — R06.09 DYSPNEA ON EXERTION: Status: ACTIVE | Noted: 2023-12-18

## 2023-12-18 PROBLEM — Z86.79 HISTORY OF ATRIAL FIBRILLATION: Status: ACTIVE | Noted: 2023-11-07

## 2023-12-18 PROBLEM — H01.003 BLEPHARITIS, BILATERAL: Status: ACTIVE | Noted: 2023-12-18

## 2023-12-18 PROBLEM — K55.1 MESENTERIC ARTERY STENOSIS (MULTI): Status: ACTIVE | Noted: 2023-12-18

## 2023-12-18 PROBLEM — R18.8 FREE FLUID IN PELVIS: Status: ACTIVE | Noted: 2023-12-18

## 2023-12-18 PROBLEM — R61 DIAPHORESIS: Status: ACTIVE | Noted: 2023-11-07

## 2023-12-18 PROBLEM — J34.89 NASAL DRAINAGE: Status: ACTIVE | Noted: 2023-12-18

## 2023-12-18 PROBLEM — K21.9 LARYNGOPHARYNGEAL REFLUX (LPR): Status: ACTIVE | Noted: 2023-12-18

## 2023-12-18 PROBLEM — Z86.73 HISTORY OF STROKE: Status: ACTIVE | Noted: 2023-11-07

## 2023-12-18 PROBLEM — J01.90 ACUTE SINUSITIS: Status: ACTIVE | Noted: 2023-12-18

## 2023-12-18 PROBLEM — G45.9 TIA (TRANSIENT ISCHEMIC ATTACK): Status: ACTIVE | Noted: 2023-11-06

## 2023-12-18 PROBLEM — Q45.3 ABNORMALITY OF PANCREATIC DUCT: Status: ACTIVE | Noted: 2023-12-18

## 2023-12-18 PROBLEM — K59.09 CHRONIC CONSTIPATION: Status: ACTIVE | Noted: 2023-12-18

## 2023-12-18 PROBLEM — K57.92 ACUTE DIVERTICULITIS: Status: ACTIVE | Noted: 2023-12-18

## 2023-12-18 PROBLEM — R42 DIZZINESS: Status: ACTIVE | Noted: 2023-11-06

## 2023-12-18 PROBLEM — M17.10 ARTHRITIS OF KNEE: Status: ACTIVE | Noted: 2023-12-18

## 2023-12-18 PROBLEM — H61.20 CERUMEN IMPACTION: Status: ACTIVE | Noted: 2023-12-18

## 2023-12-18 PROBLEM — N21.0 BLADDER STONE: Status: ACTIVE | Noted: 2023-12-18

## 2023-12-18 PROBLEM — R55 SYNCOPE: Status: ACTIVE | Noted: 2023-11-07

## 2023-12-18 PROBLEM — R10.9 ABDOMINAL PAIN: Status: ACTIVE | Noted: 2023-12-18

## 2023-12-18 PROBLEM — R60.0 PEDAL EDEMA: Status: ACTIVE | Noted: 2023-12-18

## 2023-12-18 PROBLEM — D69.6 THROMBOCYTOPENIA (CMS-HCC): Status: ACTIVE | Noted: 2023-12-18

## 2023-12-18 PROBLEM — R41.82 ALTERED MENTAL STATUS: Status: ACTIVE | Noted: 2023-11-07

## 2023-12-18 PROBLEM — R93.2 ABNORMAL FINDINGS ON DIAGNOSTIC IMAGING OF GALL BLADDER: Status: ACTIVE | Noted: 2023-12-18

## 2023-12-18 PROBLEM — J31.0 CHRONIC RHINITIS: Status: ACTIVE | Noted: 2023-12-18

## 2023-12-18 LAB
ALBUMIN SERPL BCP-MCNC: 3.9 G/DL (ref 3.4–5)
ALP SERPL-CCNC: 118 U/L (ref 33–136)
ALT SERPL W P-5'-P-CCNC: 7 U/L (ref 7–45)
ANION GAP SERPL CALC-SCNC: 13 MMOL/L (ref 10–20)
AST SERPL W P-5'-P-CCNC: 12 U/L (ref 9–39)
BILIRUB SERPL-MCNC: 0.4 MG/DL (ref 0–1.2)
BUN SERPL-MCNC: 42 MG/DL (ref 6–23)
CALCIUM SERPL-MCNC: 9 MG/DL (ref 8.6–10.3)
CHLORIDE SERPL-SCNC: 110 MMOL/L (ref 98–107)
CO2 SERPL-SCNC: 25 MMOL/L (ref 21–32)
CREAT SERPL-MCNC: 2.38 MG/DL (ref 0.5–1.05)
ERYTHROCYTE [DISTWIDTH] IN BLOOD BY AUTOMATED COUNT: 12.4 % (ref 11.5–14.5)
GFR SERPL CREATININE-BSD FRML MDRD: 21 ML/MIN/1.73M*2
GLUCOSE SERPL-MCNC: 83 MG/DL (ref 74–99)
HCT VFR BLD AUTO: 39.1 % (ref 36–46)
HGB BLD-MCNC: 11.9 G/DL (ref 12–16)
MCH RBC QN AUTO: 30.1 PG (ref 26–34)
MCHC RBC AUTO-ENTMCNC: 30.4 G/DL (ref 32–36)
MCV RBC AUTO: 99 FL (ref 80–100)
NRBC BLD-RTO: 0 /100 WBCS (ref 0–0)
PLATELET # BLD AUTO: 93 X10*3/UL (ref 150–450)
POTASSIUM SERPL-SCNC: 5.2 MMOL/L (ref 3.5–5.3)
PROT SERPL-MCNC: 5.9 G/DL (ref 6.4–8.2)
RBC # BLD AUTO: 3.96 X10*6/UL (ref 4–5.2)
SODIUM SERPL-SCNC: 143 MMOL/L (ref 136–145)
T4 FREE SERPL-MCNC: 3.22 NG/DL (ref 0.61–1.12)
TSH SERPL-ACNC: <0.01 MIU/L (ref 0.44–3.98)
WBC # BLD AUTO: 4.2 X10*3/UL (ref 4.4–11.3)

## 2023-12-18 PROCEDURE — 85027 COMPLETE CBC AUTOMATED: CPT

## 2023-12-18 PROCEDURE — 80053 COMPREHEN METABOLIC PANEL: CPT

## 2023-12-18 PROCEDURE — 4004F PT TOBACCO SCREEN RCVD TLK: CPT | Performed by: INTERNAL MEDICINE

## 2023-12-18 PROCEDURE — 84439 ASSAY OF FREE THYROXINE: CPT

## 2023-12-18 PROCEDURE — 36415 COLL VENOUS BLD VENIPUNCTURE: CPT

## 2023-12-18 PROCEDURE — 99214 OFFICE O/P EST MOD 30 MIN: CPT | Performed by: INTERNAL MEDICINE

## 2023-12-18 PROCEDURE — 3074F SYST BP LT 130 MM HG: CPT | Performed by: INTERNAL MEDICINE

## 2023-12-18 PROCEDURE — 1125F AMNT PAIN NOTED PAIN PRSNT: CPT | Performed by: INTERNAL MEDICINE

## 2023-12-18 PROCEDURE — 3078F DIAST BP <80 MM HG: CPT | Performed by: INTERNAL MEDICINE

## 2023-12-18 PROCEDURE — 1159F MED LIST DOCD IN RCRD: CPT | Performed by: INTERNAL MEDICINE

## 2023-12-18 PROCEDURE — 84443 ASSAY THYROID STIM HORMONE: CPT

## 2023-12-18 NOTE — PROGRESS NOTES
Buffy Temple is a 68 y.o. female   Patient with a past medical history of HTN, HLD, CKD III, COPD, permanent atrial fibrillation, embolic CVA, chronic systolic heart failure, chronic nonischemic cardiomyopathy, sick sinus syndrome s/p pacemaker/AICD DJD, Thrombocytopenia, goiter mammogram in July, Tubular Adenoma (due 2025)    Feels tired     No chest pain/  SOB/ dizziness  BM OK  Energy level is low  Appetite OK             Review of Systems     Constitutional: no fever, no chills, not feeling poorly, not feeling tired and no recent weight gain, no recent weight loss.   ENT: no earache, no hearing loss, no nosebleeds, no nasal discharge, no sore throat and no hoarseness.   Cardiovascular: the heart rate was not slow, the heart rate was not fast, no chest pain, no palpitations, no intermittent leg claudication and no lower extremity edema.   Respiratory: no cough, wheezing or shortness of breath at rest or exertion  Gastrointestinal: no abdominal pain, no constipation, no melena, no nausea, no diarrhea, no vomiting and no blood in stools.   Musculoskeletal: no arthralgias, no myalgias, no back pain, no joint swelling, no joint stiffness, no limb pain and no limb swelling.   Integumentary: no skin rashes, no skin lesions, no itching, no skin wound and no dry skin.   Neurological: no headache, no confusion, no numbness, no dizziness, no tingling and no fainting.   All other systems have been reviewed and are negative for complaint.       Vitals:    12/18/23 1110   BP: 120/60   Pulse: 64   Resp: 16   Temp: 36.9 °C (98.4 °F)        Physical Exam     Constitutional   General appearance: Alert and in no acute distress.     Pulmonary   Respiratory assessment: No respiratory distress, normal respiratory rhythm and effort.    Auscultation of Lungs: Clear bilateral breath sounds.   Cardiovascular   Auscultation of heart: Apical pulse normal, heart rate and rhythm normal, normal S1 and S2, no murmurs and no pericardial  rub.    Exam for edema: No peripheral edema.   Abdomen   Abdominal Exam: No bruits, normal bowel sounds, soft, non-tender, no abdominal mass palpated.    Liver and Spleen exam: No hepato-splenomegaly.   Musculoskeletal   Examination of gait: Normal.    Inspection of digits and nails: No clubbing or cyanosis of the fingernails.    Inspection/palpation of joints, bones and muscles: No joint swelling. Normal movement of all extremities.   Skin   Skin inspection: Normal skin color and pigmentation, normal skin turgor and no visible rash.   Neurologic   Cranial nerves: Nerves 2-12 were intact, no focal neuro defects.     Assessment/Plan          Patient with a past medical history of HTN, HLD, CKD III, COPD, permanent atrial fibrillation, embolic CVA, chronic systolic heart failure, chronic nonischemic cardiomyopathy, sick sinus syndrome s/p pacemaker/AICD DJD, Thrombocytopenia, goiter mammogram in July, Tubular Adenoma (due 2025)    # HTN  # HFrEF  # Afib  Stable  Rate controlled  Cont Xarelto    # Fatigue  Suspect a side effect of the meds  Check blood work    Mammogram

## 2023-12-20 NOTE — ASSESSMENT & PLAN NOTE
Loaded on Amiodarone for Cardioversion   -- Had 12 Second Post Conversion Pauses   -- AICD implanted 11/10/2023   -- St Rip Mejia DR [id number]

## 2024-01-12 DIAGNOSIS — I50.22 CHRONIC SYSTOLIC CONGESTIVE HEART FAILURE (MULTI): ICD-10-CM

## 2024-01-12 DIAGNOSIS — I10 PRIMARY HYPERTENSION: ICD-10-CM

## 2024-01-12 DIAGNOSIS — I42.9 CARDIOMYOPATHY (MULTI): ICD-10-CM

## 2024-01-12 DIAGNOSIS — I48.11 LONGSTANDING PERSISTENT ATRIAL FIBRILLATION (MULTI): ICD-10-CM

## 2024-01-12 DIAGNOSIS — I49.5 SICK SINUS SYNDROME (MULTI): ICD-10-CM

## 2024-01-19 ENCOUNTER — APPOINTMENT (OUTPATIENT)
Dept: RADIOLOGY | Facility: CLINIC | Age: 69
End: 2024-01-19
Payer: MEDICARE

## 2024-01-22 RX ORDER — AMIODARONE HYDROCHLORIDE 200 MG/1
200 TABLET ORAL DAILY
Qty: 90 TABLET | Refills: 1 | Status: SHIPPED | OUTPATIENT
Start: 2024-01-22

## 2024-01-22 RX ORDER — ATORVASTATIN CALCIUM 40 MG/1
40 TABLET, FILM COATED ORAL DAILY
Qty: 90 TABLET | Refills: 3 | Status: SHIPPED | OUTPATIENT
Start: 2024-01-22 | End: 2025-01-21

## 2024-01-22 RX ORDER — METOPROLOL SUCCINATE 100 MG/1
100 TABLET, EXTENDED RELEASE ORAL EVERY 12 HOURS
Qty: 180 TABLET | Refills: 3 | Status: SHIPPED | OUTPATIENT
Start: 2024-01-22

## 2024-01-22 RX ORDER — FUROSEMIDE 20 MG/1
TABLET ORAL
Qty: 108 TABLET | Refills: 3 | Status: SHIPPED | OUTPATIENT
Start: 2024-01-22 | End: 2025-01-21

## 2024-01-22 NOTE — TELEPHONE ENCOUNTER
Cardiac Medications Refilled.   Atorvastatin 40 mg  Rivaroxaban 20 mg  Sacubitril-valsartan 24/26 twice daily  Amiodarone 200 mg  Metoprolol succinate 100 mg    Reviewed and approved by JD JOHNS on 1/22/24 at 4:15 PM.

## 2024-02-07 ENCOUNTER — PATIENT OUTREACH (OUTPATIENT)
Dept: CARE COORDINATION | Facility: CLINIC | Age: 69
End: 2024-02-07
Payer: MEDICARE

## 2024-02-13 ENCOUNTER — HOSPITAL ENCOUNTER (OUTPATIENT)
Dept: RADIOLOGY | Facility: CLINIC | Age: 69
Discharge: HOME | End: 2024-02-13
Payer: MEDICARE

## 2024-02-13 DIAGNOSIS — I10 PRIMARY HYPERTENSION: ICD-10-CM

## 2024-02-13 DIAGNOSIS — I50.22 CHRONIC SYSTOLIC CONGESTIVE HEART FAILURE (MULTI): ICD-10-CM

## 2024-02-13 DIAGNOSIS — Z12.31 SCREENING MAMMOGRAM, ENCOUNTER FOR: ICD-10-CM

## 2024-02-13 DIAGNOSIS — E78.49 OTHER HYPERLIPIDEMIA: ICD-10-CM

## 2024-02-13 PROCEDURE — 77067 SCR MAMMO BI INCL CAD: CPT

## 2024-02-13 PROCEDURE — 77063 BREAST TOMOSYNTHESIS BI: CPT | Performed by: RADIOLOGY

## 2024-02-13 PROCEDURE — 77067 SCR MAMMO BI INCL CAD: CPT | Performed by: RADIOLOGY

## 2024-02-29 ENCOUNTER — OFFICE VISIT (OUTPATIENT)
Dept: CARDIOLOGY | Facility: CLINIC | Age: 69
End: 2024-02-29
Payer: MEDICARE

## 2024-02-29 ENCOUNTER — ANCILLARY PROCEDURE (OUTPATIENT)
Dept: CARDIOLOGY | Facility: CLINIC | Age: 69
End: 2024-02-29
Payer: MEDICARE

## 2024-02-29 VITALS
OXYGEN SATURATION: 98 % | HEART RATE: 63 BPM | WEIGHT: 183 LBS | SYSTOLIC BLOOD PRESSURE: 154 MMHG | BODY MASS INDEX: 27.11 KG/M2 | HEIGHT: 69 IN | DIASTOLIC BLOOD PRESSURE: 80 MMHG

## 2024-02-29 DIAGNOSIS — I46.9: ICD-10-CM

## 2024-02-29 DIAGNOSIS — I50.22 CHRONIC SYSTOLIC CONGESTIVE HEART FAILURE (MULTI): ICD-10-CM

## 2024-02-29 DIAGNOSIS — Z95.810 PRESENCE OF AUTOMATIC (IMPLANTABLE) CARDIAC DEFIBRILLATOR: ICD-10-CM

## 2024-02-29 DIAGNOSIS — I48.11 LONGSTANDING PERSISTENT ATRIAL FIBRILLATION (MULTI): Primary | ICD-10-CM

## 2024-02-29 DIAGNOSIS — I48.3 TYPICAL ATRIAL FLUTTER (MULTI): ICD-10-CM

## 2024-02-29 DIAGNOSIS — I49.5 SICK SINUS SYNDROME (MULTI): ICD-10-CM

## 2024-02-29 PROCEDURE — 99214 OFFICE O/P EST MOD 30 MIN: CPT | Performed by: INTERNAL MEDICINE

## 2024-02-29 PROCEDURE — 1159F MED LIST DOCD IN RCRD: CPT | Performed by: INTERNAL MEDICINE

## 2024-02-29 PROCEDURE — 93005 ELECTROCARDIOGRAM TRACING: CPT

## 2024-02-29 PROCEDURE — 3079F DIAST BP 80-89 MM HG: CPT | Performed by: INTERNAL MEDICINE

## 2024-02-29 PROCEDURE — 1126F AMNT PAIN NOTED NONE PRSNT: CPT | Performed by: INTERNAL MEDICINE

## 2024-02-29 PROCEDURE — 3077F SYST BP >= 140 MM HG: CPT | Performed by: INTERNAL MEDICINE

## 2024-02-29 ASSESSMENT — PAIN SCALES - GENERAL: PAINLEVEL: 0-NO PAIN

## 2024-02-29 ASSESSMENT — ENCOUNTER SYMPTOMS
DEPRESSION: 0
LOSS OF SENSATION IN FEET: 0
OCCASIONAL FEELINGS OF UNSTEADINESS: 0

## 2024-02-29 NOTE — ASSESSMENT & PLAN NOTE
Maintained on amiodarone 200 mg daily for arrhythmia control in the setting of atrial fibrillation with RVR and sick sinus syndrome with significant pauses status post AICD/permanent pacemaker implantation  -- Sacubitril-valsartan 24/26 twice daily  -- Metoprolol succinate 100 mg twice daily

## 2024-02-29 NOTE — ASSESSMENT & PLAN NOTE
Loaded on Amiodarone for Cardioversion   -- Had 12 Second Post Conversion Pauses   -- AICD implanted 11/10/2023   -- St Rip Mejia DR [id number]  -- 2/29/2024, she returns for follow-up with sinus rhythm  -- Continue current medications including amiodarone 200 mg, metoprolol succinate 100 mg twice daily rivaroxaban 200 mg

## 2024-02-29 NOTE — PROGRESS NOTES
Chief Complaint:   Follow-up (3 month fuv. Pt c/o SOB with exertion on occasion, last time being yesterday while walking up the steps)     History Of Present Illness:    Buffy Temple is a 68 y.o. female presenting with  pertinent cardiac history of permanent atrial fibrillation, chronic systolic heart failure, chronic nonischemic cardiomyopathy, who is here to follow-up with cardiology for continued management of Permanent Atrial Fibrillation , Chronic Systolic Heart Failure     Pertinent Cardiology Hx   Hx of PEA Cardiac Arrest 1/31/2020 -- Witnessed event in ER ; Occurring in the setting of possible septic shock / Atrial Fibrillation with RVR     She was on Sotalol 40 mg BID and Metoprolol Succinate 100 mg home dose. She had apparently received 100 mg PO Metoprolol Succinate for a reported missed dose +and then was placed on Diltiazem 20 mg Push + GTT for Atrial Fibrillation with RVR and was receiving Azithromycin / Ceftriaxone at the time of her event for presumed Community Acquired Bacterial Pneumonia. She was also exceptionally acidotic with ABG of 7.05/54/59 and elevated Lactic Acid of 3.6. She was admitted to ICU, Diltiazem Gtt Discontinued. Sotalol was held due to renal issues as well as the circumstances of her PEA arrest. Medications were adjusted to reflect her drop in her LVEF. At time of discharge, she was to have been referred back to Dr Ashraf for management of her Persistent Atrial Fibrillation.     Medications as reconciled at time of discharge were   -- Aldactone 25 mg oral tablet - 1 tab(s) orally 2 times a day ( Stopped )   -- Losartan 25 mg oral tablet - 1 tab(s) orally once a day  -- isosorbide dinitrate 10 mg oral tablet - 1 tab(s) orally 3 times a day ( Stopped )   -- Metoprolol Tartrate 25 mg oral tablet - 1 tab(s) orally every 12 hours  -- Lasix 40 mg oral tablet - 1 tab(s) orally once a day ( Stopped)   -- Digoxin 125 mcg (0.125 mg) oral tablet - 1 tab(s) orally once a day ( Stopped  )  -- Rivaroxaban 20 mg oral tablet - 1 tab(s) orally once a day (in the evening)     Persistent Atrial Fibrillation -- CHADS-VASC = 6  First noted In 2013. She presented with atrial fibrillation and cardiomyopathy secondary to tachycardia. She failed cardioversion. She was put on amiodarone and converted to sinus rhythm and subsequently underwent PVAI in January 2014. She had one episode of paroxsymal A. fib in March 2014. Subsequently on telemetry. Remote monitoring showed normal sinus rhythm. Her oral anticoagulation was discontinued. In 2015 she had a stroke involving the distal  left MCA territory. She was re-started on oral anticoagulation and underwent re-do PVAI following failed Dofetilide loading in September 2017. She maintained NSR until 7/2019 at which time she was started on Sotalol and then underwent DCCV 8/2019 with restoration of Sinus Rhythm. This was apparently maintained until January 2020 when she had her PEA arrest. Her 14 day event monitor in March 2020 revealed about 47% atrial fibrillation burden of which she is asymptomatic.      She is seen today for routine follow-up. Overall, she feels that she is doing very well. She notes that she has still has atrial fibrillation, but she notes that she does not feel it. She is still smoking, but is down to a quarter to half pack daily. Price is becoming an issue, which is prompting her to consider abstinence. She does have a prescription for Chantix but has not started it. She was recently started on simvastatin 20 by her PCP. She is tolerating at this time.     ( 11/7/2022) She returns for scheduled followup. She has been able to secure laboratory studies. However, she has been compliant with all medications. She has been able to do all of her daily activities without undue compromise. She denies any chest pressure, pain, palpitations, shortness of breath. We discussed her EKG, she is somewhat surprised if he that she is back in atrial fibrillation      Today ( 11/18/2022) she returns for follow-up of atrial fibrillation. She has tolerated increased doses of beta-blockers. She has not noticed any lightheadedness, dizziness. She feels that many of her symptoms have improved. She remains surprised that she is still in atrial fibrillation. We discussed cardioversion. At this time, she does not wish to proceed with this nor does she wish to try any new medications.     Today ( 2/28/2023) She returns for follow up. SHe had some confusion about follow up. She has been trying to lose weight by changing her diet, but notes that she drinks a lot of creamer in her coffee and she contines to smoke when she is drinking her coffee. Denies Bleeding, bruising complications.      6/12/2023  -- She returns for follow up of echocardiogram and testing. She reports that she has continued to be in her usual state of health. She denies any increased heart palpitations, shortness of breath. She does however continue to smoke, currently heavily of cigarette smoke. She reports that she has been compliant with medications.        9/11/2023  -- She returns for follow up. She had difficulty with her cardioversion scheduling. Further, she was very confused about medications. She apparently stopped taking metoprolol and instead continued on losartan with the addition of Entresto. She continues to smoke, at least a pack per day. She denies any other complaints.       11/30/2023 -- She returns for follow up. Since she was last seen she was admitted to [facility] where she was noted to have Tachy-nikki and had 12 second post conversion pauses,    2/29/2024 -- She presents for follow up. She reports that she has overall been well.  She wonders whether or not she truly needed to have her permanent pacemaker and we again reviewed what was occurring up to and including 12-second pauses after her atrial fibrillation converted to sinus rhythm.  Otherwise, she offers no complaints.  She continues  "to try to quit her smoking.  She feels better.        Patient denies chest pain and angina. Pt denies shortness of breath, dyspnea on exertion, orthopnea, and paroxysmal nocturnal dyspnea. Pt denies worsening lower extremity edema. Pt denies palpitations or syncope. No recent falls. No fever or chills. No cough. No change in bowel or bladder habits. No travel. No sick contacts. No recent travel     12 point review of systems was performed and is otherwise negative.  Past medical history: As above.  Medications: Reviewed.  Allergies: Reviewed.  Social history: Patient denies alcohol abuse, or illicit drug use. She unfortunately still smokes. She has not tried Chantix.  Family history: No sudden cardiac death or premature coronary artery disease.   .     Last Recorded Vitals:  Vitals:    02/29/24 0849   BP: 154/80   BP Location: Right arm   Patient Position: Sitting   Pulse: 63   SpO2: 98%   Weight: 83 kg (183 lb)   Height: 1.753 m (5' 9\")         Past Medical History:  She has a past medical history of Essential (primary) hypertension (11/18/2022), Other conditions influencing health status (08/18/2013), Personal history of other endocrine, nutritional and metabolic disease, and Personal history of other specified conditions.    Past Surgical History:  She has a past surgical history that includes Other surgical history (08/23/2013); MR angio head wo IV contrast (9/13/2015); MR angio neck w IV contrast (9/13/2015); Cardiac electrophysiology procedure (Left, 11/10/2023); and Cardiac electrophysiology procedure (Left, 11/10/2023).      Social History:  She reports that she has been smoking cigarettes. She has been smoking an average of .25 packs per day. She has never used smokeless tobacco. She reports that she does not currently use alcohol. She reports that she does not currently use drugs.    Family History:  No family history on file.     Allergies:  Patient has no known allergies.    Outpatient " "Medications:  Current Outpatient Medications   Medication Instructions    amiodarone (PACERONE) 200 mg, oral, Daily    atorvastatin (LIPITOR) 40 mg, oral, Daily    cholecalciferol (Vitamin D-3) 50 mcg (2,000 unit) capsule 1 capsule, oral, Daily    furosemide (Lasix) 20 mg tablet Take 1 tablet (20 mg) by mouth once a day on Sunday, Monday, Wednesday, Friday, and Saturday AND 2 tablets (40 mg) once a day on Tuesday and Thursday.    metoprolol succinate XL (TOPROL-XL) 100 mg, oral, Every 12 hours    naproxen sodium (ALEVE) 220 mg, oral, As needed    rivaroxaban (XARELTO) 20 mg, oral, Daily, Take with food.    sacubitriL-valsartan (Entresto) 24-26 mg tablet 1 tablet, oral, 2 times daily       Physical Exam:  /80 (BP Location: Right arm, Patient Position: Sitting)   Pulse 63   Ht 1.753 m (5' 9\")   Wt 83 kg (183 lb)   SpO2 98%   BMI 27.02 kg/m²     General Appearance:  Alert, cooperative, no distress, appears stated age   Head:  Normocephalic, without obvious abnormality, atraumatic   Eyes:  PERRL, conjunctiva/corneas clear, EOM's intact, fundi benign, both eyes   Ears:  Normal TM's and external ear canals, both ears   Nose: Nares normal, septum midline,mucosa normal, no drainage or sinus tenderness   Throat: Lips, mucosa, and tongue normal; teeth and gums normal   Neck: Supple, symmetrical, trachea midline, no adenopathy;  thyroid: not enlarged, symmetric, no tenderness/mass/nodules; no carotid bruit or JVD   Back:   Symmetric, no curvature, ROM normal, no CVA tenderness   Lungs:   Clear to auscultation bilaterally, respirations unlabored   Heart:  Regular rate and rhythm, S1 and S2 normal, no murmur, rub, or gallop   Abdomen:   Soft, non-tender, bowel sounds active all four quadrants,  no masses, no organomegaly   Extremities: Extremities normal, atraumatic, no cyanosis or edema   Pulses: 2+ and symmetric   Skin: Skin color, texture, turgor normal, no rashes or lesions   Lymph nodes: Cervical, " supraclavicular, and axillary nodes normal   Neurologic: Normal          Last Labs:  CBC -  Lab Results   Component Value Date    WBC 4.2 (L) 12/18/2023    HGB 11.9 (L) 12/18/2023    HCT 39.1 12/18/2023    MCV 99 12/18/2023    PLT 93 (L) 12/18/2023       CMP -  Lab Results   Component Value Date    CALCIUM 9.0 12/18/2023    PHOS 2.9 11/07/2022    PROT 5.9 (L) 12/18/2023    ALBUMIN 3.9 12/18/2023    AST 12 12/18/2023    ALT 7 12/18/2023    ALKPHOS 118 12/18/2023    BILITOT 0.4 12/18/2023       LIPID PANEL -   Lab Results   Component Value Date    CHOL 170 06/02/2023    TRIG 64 06/02/2023    HDL 38.8 (A) 06/02/2023    CHHDL 4.4 06/02/2023    LDLF 123 (H) 01/04/2022    VLDL 19 01/04/2022       RENAL FUNCTION PANEL -   Lab Results   Component Value Date    GLUCOSE 83 12/18/2023     12/18/2023    K 5.2 12/18/2023     (H) 12/18/2023    CO2 25 12/18/2023    ANIONGAP 13 12/18/2023    BUN 42 (H) 12/18/2023    CREATININE 2.38 (H) 12/18/2023    CALCIUM 9.0 12/18/2023    PHOS 2.9 11/07/2022    ALBUMIN 3.9 12/18/2023        Lab Results   Component Value Date    BNP 50 06/02/2023    HGBA1C 6.1 (H) 11/06/2023       Last Cardiology Tests:  ECG:    IN Office EKG 2/29/2024 -- Sinus Rhythm, Septal Infarction pattern     In office EKG 9/11/2023  -- Rapid atrial flutter at 2-1 conduction     In office EKG 6/12/2023  -- Rate controlled atrial flutter  In office EKG 02/28/2022 3  --- Atrial fibrillation/flutter with controlled ventricular rates     In office EKG 11/18/2022  -- Atrial fibrillation with controlled rates ` 90      In office EKG 11/7/2022   -- Atrial fibrillation with RVR, Rates ~ 118     Event Monitor 11/2021  -- [employer]   --Predominant rhythm sinus bradycardia to sinus tachycardia with occurrences of atrial fibrillation with RVR  --Minimum heart rate 52 beats, maximum heart rate 172 beats, average heart rate 80 beats  --Single episode nonsustained ventricular tachycardia of 9 beats  --15,679  paroxysmal supraventricular contractions with 1% burden 144 occurrences of SVT, longest episode 11 beats  --Atrial fibrillation burden 52.81%, longest sustained episode 2 days 21 hours.    IN Office EKG 11/17/2021  -- Atrial FIbrillation with Controlled VR    Last Event Monitor 03/2020   47% Atrial Fibrillation [place]   Avg Rates ~ 75-89  Atrial Fibrillation Ranges ~ 104-173  Sinus Rhythm Rnages ~ 71-86     Interoffice EKG   -- 07/2020  -- [place] Sinus Rhythm  -- Normal Intervals ( Qtc 420 )   -- TWI in ( V4-V6)      Echo:  Echocardiogram 06/2023  1. Left ventricular systolic function is mildly to moderately decreased with a 35-40% estimated ejection fraction.  2. There is global hypokinesis of the left ventricle with minor regional variations.  3. Spectral Doppler shows an impaired relaxation pattern of left ventricular diastolic filling.  4. Increased LV mass.  5. There is moderate concentric left ventricular hypertrophy.  6. The left ventricular posterior wall thickness is moderately increased.  7. There is mildly reduced right ventricular systolic function.  8. The left atrium is moderate to severely dilated.  9. Moderate mitral valve regurgitation.  10. Compared with study from 12/9/2021, there is a significant reduction in LVEF from 55-60% to 35-40%.    Echocardiogram 12/2021  1. The left ventricular systolic function is normal with a 55-60% estimated ejection fraction.  2. The left atrium is severely dilated.  3. Interval improvement in Mitral Regurgitation Volume from 17.60 ml to 15.91 ml.  4. RVSP within normal limits    Echocardiogram 07/2020  1. The left ventricular systolic function is low normal with a 50-55% estimated ejection fraction.  2. There is mild to moderate eccentric left ventricular hypertrophy.  3. The left atrium is severely dilated.  4. Mild to moderate mitral valve regurgitation.  5. The patient is in atrial fibrillation which may influence the estimate of left ventricular function and  transvalvular flows.     Echocardiogram 01/2020 ( following PEA Arrest / Septic Shock )   1. The left ventricular systolic function is severely decreased with a 15-20% estimated ejection fraction.  2. There is global hypokinesis of the left ventricle with minor regional variations.  3. The patient is in atrial fibrillation which may influence the estimate of left ventricular function and transvalvular flows.  4. The left ventricular cavity size is moderately dilated.  5. There is mild to moderate eccentric left ventricular hypertrophy. Increased LV mass.  6. The left atrium is severely dilated.  7. Moderate mitral valve regurgitation.  8. There is mild to moderate tricuspid regurgitation.  9. There is a small pericardial effusion. There is no evidence of cardiac         Lab review: I have personally reviewed the laboratory result(s)   Diagnostic review: I have personally reviewed the result(s) of the EKG, Echocardiogram, and Holter Monitor .   Imaging review: I have  personally reviewed the result(s) Device interrogations     Assessment/Plan   Problem List Items Addressed This Visit       Atrial fibrillation (CMS/HCC) - Primary    Overview     Persistent Atrial Fibrillation -- CHADS-VASC = 6  First noted In 2013. She presented with atrial fibrillation and cardiomyopathy secondary to tachycardia. She failed cardioversion. She was put on amiodarone and converted to sinus rhythm and subsequently underwent PVAI in January 2014. She had one episode of paroxsymal A. fib in March 2014. Subsequently on telemetry. Remote monitoring showed normal sinus rhythm. Her oral anticoagulation was discontinued. In 2015 she had a stroke involving the distal  left MCA territory. She was re-started on oral anticoagulation and underwent re-do PVAI following failed Dofetilide loading in September 2017. She maintained NSR until 7/2019 at which time she was started on Sotalol and then underwent DCCV 8/2019 with restoration of Sinus Rhythm.  This was apparently maintained until January 2020 when she had her PEA arrest. Her 14 day event monitor in March 2020 revealed about 47% atrial fibrillation burden of which she is asymptomatic.         Current Assessment & Plan     Loaded on Amiodarone for Cardioversion   -- Had 12 Second Post Conversion Pauses   -- AICD implanted 11/10/2023   -- St Rip Mejia DR [id number]  -- 2/29/2024, she returns for follow-up with sinus rhythm  -- Continue current medications including amiodarone 200 mg, metoprolol succinate 100 mg twice daily rivaroxaban 200 mg           Atrial flutter (CMS/[facility])    Cardiac arrest with pulseless electrical activity (CMS/[facility])    Overview     Hx of PEA Cardiac Arrest 1/31/2020 -- Witnessed event in ER ; Occurring in the setting of possible septic shock / Atrial Fibrillation with RVR     She was on Sotalol 40 mg BID and Metoprolol Succinate 100 mg home dose. She had apparently received 100 mg PO Metoprolol Succinate for a reported missed dose +and then was placed on Diltiazem 20 mg Push + GTT for Atrial Fibrillation with RVR and was receiving Azithromycin / Ceftriaxone at the time of her event for presumed Community Acquired Bacterial Pneumonia. She was also exceptionally acidotic with ABG of 7.05/54/59 and elevated Lactic Acid of 3.6. She was admitted to ICU, Diltiazem Gtt Discontinued. Sotalol was held due to renal issues as well as the circumstances of her PEA arrest. Medications were adjusted to reflect her drop in her LVEF. At time of discharge, she was to have been referred back to Dr Ashraf for management of her Persistent Atrial Fibrillation.         Chronic systolic congestive heart failure (CMS/[facility])    Overview     Chronic systolic heart failure likely related to atrial fibrillation  --Most recent echocardiogram showed recovered ejection fraction         Current Assessment & Plan     Maintained on amiodarone 200 mg daily for arrhythmia control in the setting of atrial fibrillation with  RVR and sick sinus syndrome with significant pauses status post AICD/permanent pacemaker implantation  -- Sacubitril-valsartan 24/26 twice daily  -- Metoprolol succinate 100 mg twice daily         Presence of automatic (implantable) cardiac defibrillator    Overview     Device -  -- St Rip Mejia DR [id number]         Sick sinus syndrome (CMS/HCC)         Jose J Sands, DO

## 2024-02-29 NOTE — PATIENT INSTRUCTIONS
"It was a pleasure seeing you today. I would like to see you back in clinic in 6 months. You can call my office if questions arise between now and our next visit.     Today, we talked about your pacemaker and your heart     -- Please Continue ON the Amiodarone at 200 mg Daily. You still have a lot of Atrial Fibrillation [place]    -- Continue on Entresto at 24-26 mg Twice Daily . We will likely try and increase this dose over the next little bit (Probably our next appointment )     -- Continue on Metoprolol Succinate 100 mg Daily     -- Increase your Furosemide to 20 mg DAILY.. I have written your prescription to allow some extra doses if you need to take them.         Please stop smoking.    --Try to cut back on the number of cigarettes that you smoke.    -- Try to increase the interval between cigarettes.   -- Try to use substitutes such as sugar-free candy carrots,celery sticks as in between.  --  Once you are down to less than half a pack a day try to go cold turkey.   -- Try to designate areas in the your home as smoke-free areas.   -- Try to reduce the number of ashtrays and other reminders of smoking.   -- Try to keep any major something that you dislike next to your cigarette pack to try to develop a mental aversion to smoking          Below are some Heart Health Tips that we provide to all of our patients. I hope you fing them useful.     - If you are having problems with medications, consider looking at the following websites.   --  \"GoodRx\"   --  \"[facility]\"      - We are happy to supply written prescriptions if needed to allow you to obtain your medications from different pharmacies. Additionally, if you are having issues with mail order delivery, please let us know. We can send a limited supply of your medications to your local pharmacy.     -  We recommend you follow a heart healthy diet. Watch food labels and try not to eat more than 2,500 mg of sodium per day. Avoid foods high " in salt like processed meats (lunch meats, stevens, and sausage), processed foods (boxed dinners, canned soups), fried and fast foods. Monitor serving sizes and if the sodium per serving size is more than 200 mg, avoid those foods. If the sodium per serving size is between 100-200 mg, you can use those in limited quantities. Try to choose foods where the amount of sodium per serving size is less than 100 mg. Try to eat a diet rich in fruits and vegetables, whole grains, low fat dairy products, skinless poultry and fish, nuts, beans, non-tropical vegetable oils. Limit saturated fat, trans fat, sodium, red meats, and sugar-sweetened beverages.   Limit alcohol     -The combination of a reduced-calorie diet and increased physical activity is recommended. Adults should aim to get at least 150 minutes of moderate physical activity per week (30 minutes of moderate physical activities at least 5 days per week). Examples of moderate physical activities include brisk walking, swimming, aerobic dancing, heavy gardening, jumping rope, bicycling 10 MPH or faster, tennis, hiking uphill or with a heavy backpack. Please let us know if you would like to learn more about your nutrition and calories and additional options including weight loss programs to help you reach your goal.     -If you smoke, stop smoking. If you stop smoking you can help get rid of a major source of stress to your heart. Smoking makes your heart rate and blood pressure go up and increases your risk or developing cardiovascular diseases and worsen symptoms associated with heart failure.     -Obtain a BP monitor and monitor your BP daily. Check it around the same time each day; at least 1 hour after taking your medications. Record your BP in a log and bring your log with you to your doctors appointment.     -F/u with your PCP as recommended. ;in

## 2024-03-04 ENCOUNTER — HOSPITAL ENCOUNTER (OUTPATIENT)
Dept: CARDIOLOGY | Facility: CLINIC | Age: 69
Discharge: HOME | End: 2024-03-04
Payer: MEDICARE

## 2024-03-04 DIAGNOSIS — I42.9 CARDIOMYOPATHY, UNSPECIFIED TYPE (MULTI): ICD-10-CM

## 2024-03-14 ENCOUNTER — HOSPITAL ENCOUNTER (OUTPATIENT)
Dept: CARDIOLOGY | Facility: CLINIC | Age: 69
Discharge: HOME | End: 2024-03-14
Payer: MEDICARE

## 2024-03-14 DIAGNOSIS — I42.9 CARDIOMYOPATHY, UNSPECIFIED TYPE (MULTI): ICD-10-CM

## 2024-03-14 DIAGNOSIS — Z95.810 CARDIAC DEFIBRILLATOR IN PLACE: ICD-10-CM

## 2024-03-14 PROCEDURE — 93296 REM INTERROG EVL PM/IDS: CPT

## 2024-03-14 PROCEDURE — 93295 DEV INTERROG REMOTE 1/2/MLT: CPT | Performed by: INTERNAL MEDICINE

## 2024-03-18 ENCOUNTER — HOSPITAL ENCOUNTER (OUTPATIENT)
Dept: CARDIOLOGY | Facility: CLINIC | Age: 69
Discharge: HOME | End: 2024-03-18
Payer: MEDICARE

## 2024-03-18 DIAGNOSIS — Z95.810 CARDIAC DEFIBRILLATOR IN PLACE: ICD-10-CM

## 2024-03-18 DIAGNOSIS — I42.9 CARDIOMYOPATHY, UNSPECIFIED TYPE (MULTI): ICD-10-CM

## 2024-03-25 ENCOUNTER — APPOINTMENT (OUTPATIENT)
Dept: PRIMARY CARE | Facility: CLINIC | Age: 69
End: 2024-03-25
Payer: MEDICARE

## 2024-03-25 ENCOUNTER — HOSPITAL ENCOUNTER (OUTPATIENT)
Dept: CARDIOLOGY | Facility: CLINIC | Age: 69
Discharge: HOME | End: 2024-03-25
Payer: MEDICARE

## 2024-03-25 DIAGNOSIS — Z95.810 CARDIAC DEFIBRILLATOR IN PLACE: ICD-10-CM

## 2024-03-25 DIAGNOSIS — I42.9 CARDIOMYOPATHY, UNSPECIFIED TYPE (MULTI): ICD-10-CM

## 2024-03-29 ENCOUNTER — HOSPITAL ENCOUNTER (OUTPATIENT)
Dept: CARDIOLOGY | Facility: CLINIC | Age: 69
Discharge: HOME | End: 2024-03-29
Payer: MEDICARE

## 2024-03-29 DIAGNOSIS — Z95.810 CARDIAC DEFIBRILLATOR IN PLACE: ICD-10-CM

## 2024-03-29 DIAGNOSIS — I42.9 CARDIOMYOPATHY, UNSPECIFIED TYPE (MULTI): ICD-10-CM

## 2024-04-02 ENCOUNTER — HOSPITAL ENCOUNTER (OUTPATIENT)
Dept: CARDIOLOGY | Facility: CLINIC | Age: 69
Discharge: HOME | End: 2024-04-02
Payer: MEDICARE

## 2024-04-02 DIAGNOSIS — Z95.810 CARDIAC DEFIBRILLATOR IN PLACE: ICD-10-CM

## 2024-04-02 DIAGNOSIS — I42.9 CARDIOMYOPATHY, UNSPECIFIED TYPE (MULTI): ICD-10-CM

## 2024-04-15 ENCOUNTER — HOSPITAL ENCOUNTER (OUTPATIENT)
Dept: CARDIOLOGY | Facility: CLINIC | Age: 69
Discharge: HOME | End: 2024-04-15
Payer: MEDICARE

## 2024-04-15 DIAGNOSIS — I49.5 SICK SINUS SYNDROME (MULTI): ICD-10-CM

## 2024-04-15 DIAGNOSIS — Z95.810 PRESENCE OF AUTOMATIC (IMPLANTABLE) CARDIAC DEFIBRILLATOR: ICD-10-CM

## 2024-04-22 ENCOUNTER — HOSPITAL ENCOUNTER (OUTPATIENT)
Dept: CARDIOLOGY | Facility: CLINIC | Age: 69
Discharge: HOME | End: 2024-04-22
Payer: MEDICARE

## 2024-04-22 DIAGNOSIS — I49.5 SICK SINUS SYNDROME (MULTI): ICD-10-CM

## 2024-04-22 DIAGNOSIS — Z95.810 PRESENCE OF AUTOMATIC (IMPLANTABLE) CARDIAC DEFIBRILLATOR: ICD-10-CM

## 2024-05-28 ENCOUNTER — OFFICE VISIT (OUTPATIENT)
Dept: PRIMARY CARE | Facility: CLINIC | Age: 69
End: 2024-05-28
Payer: MEDICARE

## 2024-05-28 VITALS
RESPIRATION RATE: 16 BRPM | TEMPERATURE: 98.8 F | WEIGHT: 187.8 LBS | BODY MASS INDEX: 27.73 KG/M2 | DIASTOLIC BLOOD PRESSURE: 70 MMHG | HEART RATE: 68 BPM | SYSTOLIC BLOOD PRESSURE: 130 MMHG

## 2024-05-28 DIAGNOSIS — D69.6 THROMBOCYTOPENIA (CMS-HCC): ICD-10-CM

## 2024-05-28 DIAGNOSIS — I48.11 LONGSTANDING PERSISTENT ATRIAL FIBRILLATION (MULTI): Primary | ICD-10-CM

## 2024-05-28 DIAGNOSIS — Z28.21 IMMUNIZATION DECLINED: ICD-10-CM

## 2024-05-28 DIAGNOSIS — Z13.820 OSTEOPOROSIS SCREENING: ICD-10-CM

## 2024-05-28 DIAGNOSIS — N18.4 CKD (CHRONIC KIDNEY DISEASE) STAGE 4, GFR 15-29 ML/MIN (MULTI): ICD-10-CM

## 2024-05-28 DIAGNOSIS — E78.49 OTHER HYPERLIPIDEMIA: ICD-10-CM

## 2024-05-28 DIAGNOSIS — I10 PRIMARY HYPERTENSION: ICD-10-CM

## 2024-05-28 DIAGNOSIS — Z13.820 ENCOUNTER FOR OSTEOPOROSIS SCREENING IN ASYMPTOMATIC POSTMENOPAUSAL PATIENT: ICD-10-CM

## 2024-05-28 DIAGNOSIS — Z78.0 ENCOUNTER FOR OSTEOPOROSIS SCREENING IN ASYMPTOMATIC POSTMENOPAUSAL PATIENT: ICD-10-CM

## 2024-05-28 DIAGNOSIS — F17.210 CIGARETTE NICOTINE DEPENDENCE, UNCOMPLICATED: ICD-10-CM

## 2024-05-28 DIAGNOSIS — Z00.00 ROUTINE GENERAL MEDICAL EXAMINATION AT HEALTH CARE FACILITY: ICD-10-CM

## 2024-05-28 DIAGNOSIS — J44.9 CHRONIC OBSTRUCTIVE PULMONARY DISEASE, UNSPECIFIED COPD TYPE (MULTI): ICD-10-CM

## 2024-05-28 DIAGNOSIS — H93.13 TINNITUS OF BOTH EARS: ICD-10-CM

## 2024-05-28 PROCEDURE — G0439 PPPS, SUBSEQ VISIT: HCPCS | Performed by: INTERNAL MEDICINE

## 2024-05-28 PROCEDURE — 4004F PT TOBACCO SCREEN RCVD TLK: CPT | Performed by: INTERNAL MEDICINE

## 2024-05-28 PROCEDURE — G0446 INTENS BEHAVE THER CARDIO DX: HCPCS | Performed by: INTERNAL MEDICINE

## 2024-05-28 PROCEDURE — 3075F SYST BP GE 130 - 139MM HG: CPT | Performed by: INTERNAL MEDICINE

## 2024-05-28 PROCEDURE — 3078F DIAST BP <80 MM HG: CPT | Performed by: INTERNAL MEDICINE

## 2024-05-28 PROCEDURE — 1124F ACP DISCUSS-NO DSCNMKR DOCD: CPT | Performed by: INTERNAL MEDICINE

## 2024-05-28 PROCEDURE — 99497 ADVNCD CARE PLAN 30 MIN: CPT | Performed by: INTERNAL MEDICINE

## 2024-05-28 PROCEDURE — 1160F RVW MEDS BY RX/DR IN RCRD: CPT | Performed by: INTERNAL MEDICINE

## 2024-05-28 PROCEDURE — 1159F MED LIST DOCD IN RCRD: CPT | Performed by: INTERNAL MEDICINE

## 2024-05-28 PROCEDURE — G0444 DEPRESSION SCREEN ANNUAL: HCPCS | Performed by: INTERNAL MEDICINE

## 2024-05-28 PROCEDURE — 99406 BEHAV CHNG SMOKING 3-10 MIN: CPT | Performed by: INTERNAL MEDICINE

## 2024-05-28 PROCEDURE — 99214 OFFICE O/P EST MOD 30 MIN: CPT | Performed by: INTERNAL MEDICINE

## 2024-05-28 ASSESSMENT — ENCOUNTER SYMPTOMS
OCCASIONAL FEELINGS OF UNSTEADINESS: 0
LOSS OF SENSATION IN FEET: 0
DEPRESSION: 0

## 2024-05-28 ASSESSMENT — PATIENT HEALTH QUESTIONNAIRE - PHQ9
SUM OF ALL RESPONSES TO PHQ9 QUESTIONS 1 AND 2: 0
1. LITTLE INTEREST OR PLEASURE IN DOING THINGS: NOT AT ALL
2. FEELING DOWN, DEPRESSED OR HOPELESS: NOT AT ALL

## 2024-05-28 NOTE — PROGRESS NOTES
"Buffy Temple is a 68 y.o. female here for a Medicare Wellness Exam.    No chief complaint on file.       Patient with a past medical history of HTN, HLD, CKD IV, Pancytopenia, COPD, permanent atrial fibrillation, embolic CVA, chronic systolic heart failure, chronic nonischemic cardiomyopathy, sick sinus syndrome s/p pacemaker/AICD DJD, Thrombocytopenia, goiter mammogram in July, Tubular Adenoma (due 2025)     Still smokes    Has ringing in the ear x 2 months  No hearing loss      Feels fine  No chest pain/  SOB/ dizziness  BM OK  Energy level ok  Appetite OK           Medicare Wellness Exam    The patent is being seen for a follow up annual wellness visit  Past Medical, Surgical and family History: Reviewed and updated in chart  Interval History: Patient has not been hospitalized previously  Medications and Supplements: Review of all medications by a prescribing practitioner or clinical pharmacist (such as prescriptions, OTC, Herbal therapies and supplements) documented in the medical record.    Patient Self-Assessment of health: Good  Tobacco Use: Yes  Alcohol Use: No  Illicit drug use: No  Patient using opioids: No    Current Diet: in general, a \"healthy\" diet    Adequate fluid intake: Yes  Caffeine intake: Yes  Exercise frequency: moderately active    Depression/Suicide screening: PHQ2/ PHQ9 (see screenings tab)    Hearing impairment: No  Uses hearing aids N/A  Cognitive impairment Observation: No   Patient or family reported cognitive impairment: No    Bathing: independent  Dressing: independent  Walking: independent  Taking Medications: independent  Feeding: independent  Personal Hygiene: independent  Managing Finances: independent  Shopping: independent  Housework/Basic Home Maintenance: independent  Handling transportation: independent  Preparing meals: independent    Bowels: continent  Bladder: continent    Falls Risk: has notfallen in last 6 months.   Their fall has not resulted in an injury.   Fall " risk Factors: Fall Risk Factors: na  none   Care Plan Risk: Care Plan: Low/Moderate Risk: Regular physical activity such as walking, water aerobics or angelo chi to improve strength, balance, coordination and flexibility. Wear appropriate, sensible shoe wear. Remove fall hazards at home such as loose rugs, obstacles, use non-slip surface in bath or shower. Keep living space well lit.      Home Safety Risk Factors: Home Safety Risk Factors: None  Advanced Directives:  Living will: No POA: No    Patient's End of Life Decisions: Provider agree to follow.      Past Medical History:   Diagnosis Date    Essential (primary) hypertension 11/18/2022    Hypertension    Other conditions influencing health status 08/18/2013    Nontoxic Goiter    Personal history of other endocrine, nutritional and metabolic disease     History of hyperlipidemia    Personal history of other specified conditions     History of shortness of breath        Review of Systems     Constitutional: no fever, no chills, not feeling poorly, not feeling tired and no recent weight gain, no recent weight loss.   ENT: no earache, no hearing loss, no nosebleeds, no nasal discharge, no sore throat and no hoarseness.   Cardiovascular: the heart rate was not slow, the heart rate was not fast, no chest pain, no palpitations, no intermittent leg claudication and no lower extremity edema.   Respiratory: no cough, wheezing or shortness of breath at rest or exertion  Gastrointestinal: no abdominal pain, no constipation, no melena, no nausea, no diarrhea, no vomiting and no blood in stools.   Musculoskeletal: no arthralgias, no myalgias, no back pain, no joint swelling, no joint stiffness, no limb pain and no limb swelling.   Integumentary: no skin rashes, no skin lesions, no itching, no skin wound and no dry skin.   Neurological: no headache, no confusion, no numbness, no dizziness, no tingling and no fainting.   All other systems have been reviewed and are negative for  complaint.          Physical Exam     Constitutional   General appearance: Alert and in no acute distress.   Bilateral cerumen  Pulmonary   Respiratory assessment: No respiratory distress, normal respiratory rhythm and effort.    Auscultation of Lungs: Clear bilateral breath sounds.   Cardiovascular   Auscultation of heart: Apical pulse normal, heart rate and rhythm normal, normal S1 and S2, no murmurs and no pericardial rub.    Exam for edema: No peripheral edema.   Abdomen   Abdominal Exam: No bruits, normal bowel sounds, soft, non-tender, no abdominal mass palpated.    Liver and Spleen exam: No hepato-splenomegaly.   Musculoskeletal   Examination of gait: Normal.    Inspection of digits and nails: No clubbing or cyanosis of the fingernails.    Inspection/palpation of joints, bones and muscles: No joint swelling. Normal movement of all extremities.   Skin   Skin inspection: Normal skin color and pigmentation, normal skin turgor and no visible rash.   Neurologic   Cranial nerves: Nerves 2-12 were intact, no focal neuro defects.          Assessment/Plan          Patient with a past medical history of HTN, HLD, CKD IV, Pancytopenia, COPD, permanent atrial fibrillation, embolic CVA, chronic systolic heart failure, chronic nonischemic cardiomyopathy, sick sinus syndrome s/p pacemaker/AICD DJD, Thrombocytopenia, goiter mammogram in Dec, Tubular Adenoma (due 2025)     #Tinnitus bilaterally  Has wax in both ears  Doubt this is contributing but could be making things worse  Refer to ENT for ear cleanout followed by audiology    #Hypertension/chronic kidney disease stage IV  Stable  Avoid all NSAIDs    #Permanent atrial fibrillation  Rate controlled    #Dyslipidemia/chronic nonischemic cardiomyopathy/systolic congestive heart failure  Continue statins with a target LDL less than 70    #COPD/tobacco dependence #thrombocytopenia/  Once again counseled on tobacco cessation  Will check CT chest    #Bone density screening  pending  Patient declined immunizations    Discussed getting Health Care Power of  and Living Will Documents and their importance. These are legal documents obtained through a . Total Time spent in this discussion was less than 30 minutes

## 2024-06-11 ENCOUNTER — HOSPITAL ENCOUNTER (OUTPATIENT)
Dept: RADIOLOGY | Facility: CLINIC | Age: 69
Discharge: HOME | End: 2024-06-11
Payer: MEDICARE

## 2024-06-11 DIAGNOSIS — F17.210 CIGARETTE NICOTINE DEPENDENCE, UNCOMPLICATED: ICD-10-CM

## 2024-06-11 PROCEDURE — 71271 CT THORAX LUNG CANCER SCR C-: CPT

## 2024-06-13 DIAGNOSIS — E04.9 GOITER: Primary | ICD-10-CM

## 2024-06-17 ENCOUNTER — HOSPITAL ENCOUNTER (OUTPATIENT)
Dept: RADIOLOGY | Facility: CLINIC | Age: 69
Discharge: HOME | End: 2024-06-17
Payer: MEDICARE

## 2024-06-17 DIAGNOSIS — I42.9 CARDIOMYOPATHY, UNSPECIFIED TYPE (MULTI): ICD-10-CM

## 2024-06-17 DIAGNOSIS — Z95.810 CARDIAC DEFIBRILLATOR IN PLACE: Primary | ICD-10-CM

## 2024-06-17 DIAGNOSIS — E04.9 GOITER: ICD-10-CM

## 2024-06-17 PROCEDURE — 76536 US EXAM OF HEAD AND NECK: CPT

## 2024-06-17 PROCEDURE — 76536 US EXAM OF HEAD AND NECK: CPT | Performed by: INTERNAL MEDICINE

## 2024-06-19 ENCOUNTER — HOSPITAL ENCOUNTER (OUTPATIENT)
Dept: CARDIOLOGY | Facility: CLINIC | Age: 69
Discharge: HOME | End: 2024-06-19
Payer: MEDICARE

## 2024-06-19 DIAGNOSIS — Z95.810 CARDIAC DEFIBRILLATOR IN PLACE: ICD-10-CM

## 2024-06-19 DIAGNOSIS — I42.9 CARDIOMYOPATHY, UNSPECIFIED TYPE (MULTI): ICD-10-CM

## 2024-06-19 PROCEDURE — 93283 PRGRMG EVAL IMPLANTABLE DFB: CPT

## 2024-06-21 ENCOUNTER — APPOINTMENT (OUTPATIENT)
Dept: CARDIOLOGY | Facility: CLINIC | Age: 69
End: 2024-06-21
Payer: MEDICARE

## 2024-06-25 ENCOUNTER — OFFICE VISIT (OUTPATIENT)
Dept: CARDIOLOGY | Facility: CLINIC | Age: 69
End: 2024-06-25
Payer: MEDICARE

## 2024-06-25 VITALS
DIASTOLIC BLOOD PRESSURE: 79 MMHG | SYSTOLIC BLOOD PRESSURE: 172 MMHG | HEIGHT: 69 IN | WEIGHT: 187.4 LBS | OXYGEN SATURATION: 98 % | BODY MASS INDEX: 27.76 KG/M2 | HEART RATE: 61 BPM

## 2024-06-25 DIAGNOSIS — Z95.810 CARDIAC DEFIBRILLATOR IN PLACE: Primary | ICD-10-CM

## 2024-06-25 DIAGNOSIS — I48.91 ATRIAL FIBRILLATION, UNSPECIFIED TYPE (MULTI): ICD-10-CM

## 2024-06-25 DIAGNOSIS — I42.9 CARDIOMYOPATHY, UNSPECIFIED TYPE (MULTI): ICD-10-CM

## 2024-06-25 DIAGNOSIS — I49.5 SICK SINUS SYNDROME (MULTI): ICD-10-CM

## 2024-06-25 PROCEDURE — 99214 OFFICE O/P EST MOD 30 MIN: CPT | Performed by: INTERNAL MEDICINE

## 2024-06-25 PROCEDURE — 1126F AMNT PAIN NOTED NONE PRSNT: CPT | Performed by: INTERNAL MEDICINE

## 2024-06-25 PROCEDURE — 1159F MED LIST DOCD IN RCRD: CPT | Performed by: INTERNAL MEDICINE

## 2024-06-25 PROCEDURE — 3077F SYST BP >= 140 MM HG: CPT | Performed by: INTERNAL MEDICINE

## 2024-06-25 PROCEDURE — 93005 ELECTROCARDIOGRAM TRACING: CPT | Performed by: INTERNAL MEDICINE

## 2024-06-25 PROCEDURE — 3078F DIAST BP <80 MM HG: CPT | Performed by: INTERNAL MEDICINE

## 2024-06-25 PROCEDURE — 4004F PT TOBACCO SCREEN RCVD TLK: CPT | Performed by: INTERNAL MEDICINE

## 2024-06-25 ASSESSMENT — ENCOUNTER SYMPTOMS
LOSS OF SENSATION IN FEET: 0
DEPRESSION: 0
OCCASIONAL FEELINGS OF UNSTEADINESS: 0

## 2024-06-25 ASSESSMENT — PATIENT HEALTH QUESTIONNAIRE - PHQ9
1. LITTLE INTEREST OR PLEASURE IN DOING THINGS: NOT AT ALL
SUM OF ALL RESPONSES TO PHQ9 QUESTIONS 1 AND 2: 0
2. FEELING DOWN, DEPRESSED OR HOPELESS: NOT AT ALL

## 2024-06-25 ASSESSMENT — PAIN SCALES - GENERAL: PAINLEVEL: 0-NO PAIN

## 2024-06-25 NOTE — PROGRESS NOTES
Referred by Dr. Molina ref. provider found provider found for No chief complaint on file.       Buffy Temple is a 68 y.o. year old female patient with h/o permanent atrial fibrillation, chronic systolic heart failure, chronic nonischemic cardiomyopathy, cva, chronic kidney disease and current smoker. She is s/p ICD implant for SSS + NICM. Presents today for treatment options evaluation for her A Fib.     PMHx/PSHx: As above    FamHx: unremarkable     Allergies:  No Known Allergies     Review of Systems    Constitutional: not feeling tired.   Eyes: no eyesight problems.   ENT: no hearing loss and no nosebleeds.   Cardiovascular: no intermittent leg claudication and as noted in HPI.   Respiratory: no chronic cough and no shortness of breath.   Gastrointestinal: no change in bowel habits and no blood in stools.   Genitourinary: no urinary frequency and no hematuria.   Skin: no skin rashes.   Neurological: no seizures and no frequent falls.   Psychiatric: no depression and not suicidal.   All other systems have been reviewed and are negative for complaint.     Outpatient Medications:  Current Outpatient Medications   Medication Instructions    amiodarone (PACERONE) 200 mg, oral, Daily    atorvastatin (LIPITOR) 40 mg, oral, Daily    cholecalciferol (Vitamin D-3) 50 mcg (2,000 unit) capsule 1 capsule, oral, Daily    furosemide (Lasix) 20 mg tablet Take 1 tablet (20 mg) by mouth once a day on Sunday, Monday, Wednesday, Friday, and Saturday AND 2 tablets (40 mg) once a day on Tuesday and Thursday.    metoprolol succinate XL (TOPROL-XL) 100 mg, oral, Every 12 hours    naproxen sodium (ALEVE) 220 mg, oral, As needed    rivaroxaban (XARELTO) 20 mg, oral, Daily, Take with food.    sacubitriL-valsartan (Entresto) 24-26 mg tablet 1 tablet, oral, 2 times daily         Last Recorded Vitals:      11/11/2023     7:35 AM 11/11/2023    12:06 PM 11/11/2023     2:59 PM 11/30/2023     8:43 AM 12/18/2023    11:10 AM 2/29/2024     8:49  "AM 5/28/2024     2:40 PM   Vitals   Systolic 156 144 139 132 120 154 130   Diastolic 72 77 87 80 60 80 70   Heart Rate 69 72 75 57 64 63 68   Temp 36.6 °C (97.9 °F) 36.6 °C (97.9 °F) 36.6 °C (97.9 °F)  36.9 °C (98.4 °F)  37.1 °C (98.8 °F)   Resp 18 18 18  16  16   Height (in)    1.753 m (5' 9\")  1.753 m (5' 9\")    Weight (lb)    185 184.6 183 187.8   BMI    27.32 kg/m2 27.26 kg/m2 27.02 kg/m2 27.73 kg/m2   BSA (m2)    2.02 m2 2.02 m2 2.01 m2 2.04 m2   Visit Report    Report Report Report Report    Visit Vitals  OB Status Postmenopausal   Smoking Status Every Day        Physical Exam:  Constitutional: alert and in no acute distress.   Eyes: no erythema, swelling or discharge from the eye .   Neck: neck is supple, symmetric, trachea midline, no masses  and no thyromegaly .   Pulmonary: no increased work of breathing or signs of respiratory distress  and lungs clear to auscultation.    Cardiovascular: carotid pulses 2+ bilaterally with no bruit , JVP was normal, no thrills , regular rhythm, normal S1 and S2, no murmurs , pedal pulses 2+ bilaterally  and no edema .   Abdomen: abdomen non-tender, no masses  and no hepatomegaly .   Skin: skin warm and dry, normal skin turgor .   Psychiatric judgment and insight is normal  and oriented to person, place and time .        Assessment/Plan   Problem List Items Addressed This Visit    None      Buffy Temple is a 68 y.o. year old female patient with h/o permanent atrial fibrillation, chronic systolic heart failure, chronic nonischemic cardiomyopathy, cva, chronic kidney disease and current smoker. She is s/p ICD implant for SSS + NICM. Presents today for treatment options evaluation for her A Fib.   Her current ECG shows NSR with narrow QRS and HR of 61 bpm. Recent device interrogation showed normal device function and multiple episodes of A fib with HR in the 150's, last episode on June 17. This is showing failure of amiodarone and her best option would be to proceed " with RF ablation. All the R/B/A of the procedure. The patient expressed understanding and agrees to proceed.         Po Pemberton MD  Cardiac Electrophysiology      Thank you very much for allowing me to participate in the care of this pleasant patient. Please do not hesitate to contact me with any further questions or concerns regarding his care.    **Disclaimer: This note was dictated by speech recognition, and every effort has been made to prevent any error in transcription, however minor errors may be present**

## 2024-06-26 LAB
ATRIAL RATE: 61 BPM
P AXIS: 83 DEGREES
P OFFSET: 186 MS
P ONSET: 140 MS
PR INTERVAL: 168 MS
Q ONSET: 224 MS
QRS COUNT: 10 BEATS
QRS DURATION: 90 MS
QT INTERVAL: 408 MS
QTC CALCULATION(BAZETT): 410 MS
QTC FREDERICIA: 410 MS
R AXIS: 37 DEGREES
T AXIS: 53 DEGREES
T OFFSET: 428 MS
VENTRICULAR RATE: 61 BPM

## 2024-07-01 ENCOUNTER — OFFICE VISIT (OUTPATIENT)
Dept: CARDIOLOGY | Facility: CLINIC | Age: 69
End: 2024-07-01
Payer: MEDICARE

## 2024-07-01 VITALS
WEIGHT: 185 LBS | SYSTOLIC BLOOD PRESSURE: 124 MMHG | BODY MASS INDEX: 27.4 KG/M2 | DIASTOLIC BLOOD PRESSURE: 66 MMHG | OXYGEN SATURATION: 99 % | HEART RATE: 105 BPM | HEIGHT: 69 IN

## 2024-07-01 DIAGNOSIS — Z95.810 PRESENCE OF AUTOMATIC (IMPLANTABLE) CARDIAC DEFIBRILLATOR: ICD-10-CM

## 2024-07-01 DIAGNOSIS — E78.49 OTHER HYPERLIPIDEMIA: ICD-10-CM

## 2024-07-01 DIAGNOSIS — I50.22 CHRONIC SYSTOLIC CONGESTIVE HEART FAILURE (MULTI): ICD-10-CM

## 2024-07-01 DIAGNOSIS — I46.9: ICD-10-CM

## 2024-07-01 DIAGNOSIS — I48.11 LONGSTANDING PERSISTENT ATRIAL FIBRILLATION (MULTI): Primary | ICD-10-CM

## 2024-07-01 LAB
ATRIAL RATE: 394 BPM
Q ONSET: 222 MS
QRS COUNT: 17 BEATS
QRS DURATION: 98 MS
QT INTERVAL: 298 MS
QTC CALCULATION(BAZETT): 393 MS
QTC FREDERICIA: 359 MS
R AXIS: 55 DEGREES
T AXIS: -22 DEGREES
T OFFSET: 371 MS
VENTRICULAR RATE: 105 BPM

## 2024-07-01 PROCEDURE — 99214 OFFICE O/P EST MOD 30 MIN: CPT | Performed by: INTERNAL MEDICINE

## 2024-07-01 PROCEDURE — 1126F AMNT PAIN NOTED NONE PRSNT: CPT | Performed by: INTERNAL MEDICINE

## 2024-07-01 PROCEDURE — 93005 ELECTROCARDIOGRAM TRACING: CPT | Performed by: INTERNAL MEDICINE

## 2024-07-01 PROCEDURE — 1159F MED LIST DOCD IN RCRD: CPT | Performed by: INTERNAL MEDICINE

## 2024-07-01 PROCEDURE — 3078F DIAST BP <80 MM HG: CPT | Performed by: INTERNAL MEDICINE

## 2024-07-01 PROCEDURE — 3074F SYST BP LT 130 MM HG: CPT | Performed by: INTERNAL MEDICINE

## 2024-07-01 ASSESSMENT — PAIN SCALES - GENERAL: PAINLEVEL: 0-NO PAIN

## 2024-07-01 ASSESSMENT — ENCOUNTER SYMPTOMS
LOSS OF SENSATION IN FEET: 0
OCCASIONAL FEELINGS OF UNSTEADINESS: 0
DEPRESSION: 0

## 2024-07-01 NOTE — PROGRESS NOTES
Chief Complaint:   Cardiomyopathy, Atrial Fibrillation, and Follow-up (4 month)     History Of Present Illness:    Buffy Temple is a 68 y.o. female presenting with  pertinent cardiac history of permanent atrial fibrillation, chronic systolic heart failure, chronic nonischemic cardiomyopathy, who is here to follow-up with cardiology for continued management of Permanent Atrial Fibrillation , Chronic Systolic Heart Failure     Pertinent Cardiology Hx   Hx of PEA Cardiac Arrest 1/31/2020 -- Witnessed event in ER ; Occurring in the setting of possible septic shock / Atrial Fibrillation with RVR     She was on Sotalol 40 mg BID and Metoprolol Succinate 100 mg home dose. She had apparently received 100 mg PO Metoprolol Succinate for a reported missed dose +and then was placed on Diltiazem 20 mg Push + GTT for Atrial Fibrillation with RVR and was receiving Azithromycin / Ceftriaxone at the time of her event for presumed Community Acquired Bacterial Pneumonia. She was also exceptionally acidotic with ABG of 7.05/54/59 and elevated Lactic Acid of 3.6. She was admitted to ICU, Diltiazem Gtt Discontinued. Sotalol was held due to renal issues as well as the circumstances of her PEA arrest. Medications were adjusted to reflect her drop in her LVEF. At time of discharge, she was to have been referred back to Dr Ashraf for management of her Persistent Atrial Fibrillation.     Medications as reconciled at time of discharge were   -- Aldactone 25 mg oral tablet - 1 tab(s) orally 2 times a day ( Stopped )   -- Losartan 25 mg oral tablet - 1 tab(s) orally once a day  -- isosorbide dinitrate 10 mg oral tablet - 1 tab(s) orally 3 times a day ( Stopped )   -- Metoprolol Tartrate 25 mg oral tablet - 1 tab(s) orally every 12 hours  -- Lasix 40 mg oral tablet - 1 tab(s) orally once a day ( Stopped)   -- Digoxin 125 mcg (0.125 mg) oral tablet - 1 tab(s) orally once a day ( Stopped )  -- Rivaroxaban 20 mg oral tablet - 1 tab(s) orally  once a day (in the evening)     Persistent Atrial Fibrillation -- CHADS-VASC = 6  First noted In 2013. She presented with atrial fibrillation and cardiomyopathy secondary to tachycardia. She failed cardioversion. She was put on amiodarone and converted to sinus rhythm and subsequently underwent PVAI in January 2014. She had one episode of paroxsymal A. fib in March 2014. Subsequently on telemetry. Remote monitoring showed normal sinus rhythm. Her oral anticoagulation was discontinued. In 2015 she had a stroke involving the distal  left MCA territory. She was re-started on oral anticoagulation and underwent re-do PVAI following failed Dofetilide loading in September 2017. She maintained NSR until 7/2019 at which time she was started on Sotalol and then underwent DCCV 8/2019 with restoration of Sinus Rhythm. This was apparently maintained until January 2020 when she had her PEA arrest. Her 14 day event monitor in March 2020 revealed about 47% atrial fibrillation burden of which she is asymptomatic.      She is seen today for routine follow-up. Overall, she feels that she is doing very well. She notes that she has still has atrial fibrillation, but she notes that she does not feel it. She is still smoking, but is down to a quarter to half pack daily. Price is becoming an issue, which is prompting her to consider abstinence. She does have a prescription for Chantix but has not started it. She was recently started on simvastatin 20 by her PCP. She is tolerating at this time.     ( 11/7/2022) She returns for scheduled followup. She has been able to secure laboratory studies. However, she has been compliant with all medications. She has been able to do all of her daily activities without undue compromise. She denies any chest pressure, pain, palpitations, shortness of breath. We discussed her EKG, she is somewhat surprised if he that she is back in atrial fibrillation     Today ( 11/18/2022) she returns for follow-up of  atrial fibrillation. She has tolerated increased doses of beta-blockers. She has not noticed any lightheadedness, dizziness. She feels that many of her symptoms have improved. She remains surprised that she is still in atrial fibrillation. We discussed cardioversion. At this time, she does not wish to proceed with this nor does she wish to try any new medications.     Today ( 2/28/2023) She returns for follow up. SHe had some confusion about follow up. She has been trying to lose weight by changing her diet, but notes that she drinks a lot of creamer in her coffee and she contines to smoke when she is drinking her coffee. Denies Bleeding, bruising complications.      6/12/2023  -- She returns for follow up of echocardiogram and testing. She reports that she has continued to be in her usual state of health. She denies any increased heart palpitations, shortness of breath. She does however continue to smoke, currently heavily of cigarette smoke. She reports that she has been compliant with medications.        9/11/2023  -- She returns for follow up. She had difficulty with her cardioversion scheduling. Further, she was very confused about medications. She apparently stopped taking metoprolol and instead continued on losartan with the addition of Entresto. She continues to smoke, at least a pack per day. She denies any other complaints.       11/30/2023 -- She returns for follow up. Since she was last seen she was admitted to [facility] where she was noted to have Tachy-nikki and had 12 second post conversion pauses,    2/29/2024 -- She presents for follow up. She reports that she has overall been well.  She wonders whether or not she truly needed to have her permanent pacemaker and we again reviewed what was occurring up to and including 12-second pauses after her atrial fibrillation converted to sinus rhythm.  Otherwise, she offers no complaints.  She continues to try to quit her smoking.  She feels better.    "  7/1/2024 -- She returns for follow up. She has met with Electrophysiology and has agreed to move forward with ablation. She is tentatively scheduled for later this kye. She has been struggling with headaches and has been reducing her metoprolol to 1/2 tablet every day ( 200 mg Total Daily dose --> 50 mg total daily ) . She notes improvement in her headaches at a lower dose.  She confirms that she is not missed any doses of her anticoagulation.  We reviewed the importance of this especially leading up to her ablation.  She understands.    Patient denies chest pain and angina. Pt denies shortness of breath, dyspnea on exertion, orthopnea, and paroxysmal nocturnal dyspnea. Pt denies worsening lower extremity edema. Pt denies palpitations or syncope. No recent falls. No fever or chills. No cough. No change in bowel or bladder habits. No travel. No sick contacts. No recent travel     12 point review of systems was performed and is otherwise negative.  Past medical history: As above.  Medications: Reviewed.  Allergies: Reviewed.  Social history: Patient denies alcohol abuse, or illicit drug use. She unfortunately still smokes. She has not tried Chantix.  Family history: No sudden cardiac death or premature coronary artery disease.   .     Last Recorded Vitals:  Vitals:    07/01/24 0839   BP: 124/66   Patient Position: Sitting   Pulse: 105   SpO2: 99%   Weight: 83.9 kg (185 lb)   Height: 1.753 m (5' 9\")           Past Medical History:  She has a past medical history of Essential (primary) hypertension (11/18/2022), Other conditions influencing health status (08/18/2013), Personal history of other endocrine, nutritional and metabolic disease, and Personal history of other specified conditions.    Past Surgical History:  She has a past surgical history that includes Other surgical history (08/23/2013); MR angio head wo IV contrast (9/13/2015); MR angio neck w IV contrast (9/13/2015); Cardiac electrophysiology procedure " "(Left, 11/10/2023); and Cardiac electrophysiology procedure (Left, 11/10/2023).      Social History:  She reports that she has been smoking cigarettes. She has never used smokeless tobacco. She reports that she does not currently use alcohol. She reports that she does not currently use drugs.    Family History:  No family history on file.     Allergies:  Patient has no known allergies.    Outpatient Medications:  Current Outpatient Medications   Medication Instructions    amiodarone (PACERONE) 200 mg, oral, Daily    atorvastatin (LIPITOR) 40 mg, oral, Daily    cholecalciferol (Vitamin D-3) 50 mcg (2,000 unit) capsule 1 capsule, oral, Daily    furosemide (Lasix) 20 mg tablet Take 1 tablet (20 mg) by mouth once a day on Sunday, Monday, Wednesday, Friday, and Saturday AND 2 tablets (40 mg) once a day on Tuesday and Thursday.    metoprolol succinate XL (TOPROL-XL) 100 mg, oral, Every 12 hours    naproxen sodium (ALEVE) 220 mg, oral, As needed    rivaroxaban (XARELTO) 20 mg, oral, Daily, Take with food.    sacubitriL-valsartan (Entresto) 24-26 mg tablet 1 tablet, oral, 2 times daily       Physical Exam:  /66 (Patient Position: Sitting)   Pulse 105   Ht 1.753 m (5' 9\")   Wt 83.9 kg (185 lb)   SpO2 99%   BMI 27.32 kg/m²   General:  Patient is awake, alert, and oriented.  Patient is in no acute distress.  HEENT:  Pupils equal and reactive.  Normocephalic.  Moist mucosa.    Neck:  No thyromegaly.  Normal Jugular Venous Pressure.  Cardiovascular: Irregularly irregular with variable S1, S2 and a grade 1/6 systolic ejection murmur along the left ventricular outflow tract  Pulmonary:  Clear to auscultation bilaterally.  Abdomen:  Soft. Non-tender.   Non-distended.  Positive bowel sounds.  Lower Extremities:  2+ pedal pulses. No LE edema.  Neurologic:  Cranial nerves intact.  No focal deficit.   Skin: Skin warm and dry, normal skin turgor.   Psychiatric: Normal affect.         Last Labs:  CBC -  Lab Results   Component " Value Date    WBC 4.2 (L) 12/18/2023    HGB 11.9 (L) 12/18/2023    HCT 39.1 12/18/2023    MCV 99 12/18/2023    PLT 93 (L) 12/18/2023       CMP -  Lab Results   Component Value Date    CALCIUM 9.0 12/18/2023    PHOS 2.9 11/07/2022    PROT 5.9 (L) 12/18/2023    ALBUMIN 3.9 12/18/2023    AST 12 12/18/2023    ALT 7 12/18/2023    ALKPHOS 118 12/18/2023    BILITOT 0.4 12/18/2023       LIPID PANEL -   Lab Results   Component Value Date    CHOL 170 06/02/2023    TRIG 64 06/02/2023    HDL 38.8 (A) 06/02/2023    CHHDL 4.4 06/02/2023    LDLF 123 (H) 01/04/2022    VLDL 19 01/04/2022       RENAL FUNCTION PANEL -   Lab Results   Component Value Date    GLUCOSE 83 12/18/2023     12/18/2023    K 5.2 12/18/2023     (H) 12/18/2023    CO2 25 12/18/2023    ANIONGAP 13 12/18/2023    BUN 42 (H) 12/18/2023    CREATININE 2.38 (H) 12/18/2023    CALCIUM 9.0 12/18/2023    PHOS 2.9 11/07/2022    ALBUMIN 3.9 12/18/2023        Lab Results   Component Value Date    BNP 50 06/02/2023    HGBA1C 6.1 (H) 11/06/2023       Last Cardiology Tests:  ECG:       In Office EKG 7/1/20024 atrial fibrillation with RVR rates 105  IN Office EKG 2/29/2024 -- Sinus Rhythm, Septal Infarction pattern     In office EKG 9/11/2023  -- Rapid atrial flutter at 2-1 conduction     In office EKG 6/12/2023  -- Rate controlled atrial flutter  In office EKG 02/28/2022 3  --- Atrial fibrillation/flutter with controlled ventricular rates     In office EKG 11/18/2022  -- Atrial fibrillation with controlled rates ` 90      In office EKG 11/7/2022   -- Atrial fibrillation with RVR, Rates ~ 118     Event Monitor 11/2021  -- [employer]   --Predominant rhythm sinus bradycardia to sinus tachycardia with occurrences of atrial fibrillation with RVR  --Minimum heart rate 52 beats, maximum heart rate 172 beats, average heart rate 80 beats  --Single episode nonsustained ventricular tachycardia of 9 beats  --15,679 paroxysmal supraventricular contractions with 1% burden  144 occurrences of SVT, longest episode 11 beats  --Atrial fibrillation burden 52.81%, longest sustained episode 2 days 21 hours.    IN Office EKG 11/17/2021  -- Atrial FIbrillation with Controlled VR    Last Event Monitor 03/2020   47% Atrial Fibrillation [place]   Avg Rates ~ 75-89  Atrial Fibrillation Ranges ~ 104-173  Sinus Rhythm Rnages ~ 71-86     Interoffice EKG   -- 07/2020  -- Clarissa Sinus Rhythm  -- Normal Intervals ( Qtc 420 )   -- TWI in ( V4-V6)      Echo:  Echocardiogram 06/2023  1. Left ventricular systolic function is mildly to moderately decreased with a 35-40% estimated ejection fraction.  2. There is global hypokinesis of the left ventricle with minor regional variations.  3. Spectral Doppler shows an impaired relaxation pattern of left ventricular diastolic filling.  4. Increased LV mass.  5. There is moderate concentric left ventricular hypertrophy.  6. The left ventricular posterior wall thickness is moderately increased.  7. There is mildly reduced right ventricular systolic function.  8. The left atrium is moderate to severely dilated.  9. Moderate mitral valve regurgitation.  10. Compared with study from 12/9/2021, there is a significant reduction in LVEF from 55-60% to 35-40%.    Echocardiogram 12/2021  1. The left ventricular systolic function is normal with a 55-60% estimated ejection fraction.  2. The left atrium is severely dilated.  3. Interval improvement in Mitral Regurgitation Volume from 17.60 ml to 15.91 ml.  4. RVSP within normal limits    Echocardiogram 07/2020  1. The left ventricular systolic function is low normal with a 50-55% estimated ejection fraction.  2. There is mild to moderate eccentric left ventricular hypertrophy.  3. The left atrium is severely dilated.  4. Mild to moderate mitral valve regurgitation.  5. The patient is in atrial fibrillation which may influence the estimate of left ventricular function and transvalvular flows.     Echocardiogram 01/2020 ( following  PEA Arrest / Septic Shock )   1. The left ventricular systolic function is severely decreased with a 15-20% estimated ejection fraction.  2. There is global hypokinesis of the left ventricle with minor regional variations.  3. The patient is in atrial fibrillation which may influence the estimate of left ventricular function and transvalvular flows.  4. The left ventricular cavity size is moderately dilated.  5. There is mild to moderate eccentric left ventricular hypertrophy. Increased LV mass.  6. The left atrium is severely dilated.  7. Moderate mitral valve regurgitation.  8. There is mild to moderate tricuspid regurgitation.  9. There is a small pericardial effusion. There is no evidence of cardiac         Lab review: I have personally reviewed the laboratory result(s)   Diagnostic review: I have personally reviewed the result(s) of the EKG, Echocardiogram, and Holter Monitor .   Imaging review: I have  personally reviewed the result(s) Device interrogations     Assessment/Plan   Problem List Items Addressed This Visit       Atrial fibrillation (Multi) - Primary    Overview     Persistent Atrial Fibrillation -- CHADS-VASC = 6  First noted In 2013. She presented with atrial fibrillation and cardiomyopathy secondary to tachycardia. She failed cardioversion. She was put on amiodarone and converted to sinus rhythm and subsequently underwent PVAI in January 2014. She had one episode of paroxsymal A. fib in March 2014. Subsequently on telemetry. Remote monitoring showed normal sinus rhythm. Her oral anticoagulation was discontinued. In 2015 she had a stroke involving the distal  left MCA territory. She was re-started on oral anticoagulation and underwent re-do PVAI following failed Dofetilide loading in September 2017. She maintained NSR until 7/2019 at which time she was started on Sotalol and then underwent DCCV 8/2019 with restoration of Sinus Rhythm. This was apparently maintained until January 2020 when she had  her PEA arrest. Her 14 day event monitor in March 2020 revealed about 47% atrial fibrillation burden of which she is asymptomatic.         Current Assessment & Plan     Loaded on Amiodarone for Cardioversion   -- Had 12 Second Post Conversion Pauses   -- AICD implanted 11/10/2023   -- [employer] Me[employer]ia DR [id number]  -- 2/29/2024, she returns for follow-up with sinus rhythm  -- Continue current medications including amiodarone 200 mg, metoprolol succinate 100 mg twice daily rivaroxaban 200 mg  -- Despite appropriate therapy, she has had continued breakthrough on amiodarone  -- Referred to electrophysiology for consideration of ablation therapy and is tentatively scheduled for later half of July 2024.  -- She has reduced metoprolol from 200 mg total daily dose down to 50 mg total daily dose.  I have asked her to try and increase back to at least 100 mg total daily dose for better rate control which she is in agreement to attempting.           Relevant Orders    ECG 12 lead (Clinic Performed)    Cardiac arrest with pulseless electrical activity (Multi)    Overview     Hx of PEA Cardiac Arrest 1/31/2020 -- Witnessed event in ER ; Occurring in the setting of possible septic shock / Atrial Fibrillation with RVR     She was on Sotalol 40 mg BID and Metoprolol Succinate 100 mg home dose. She had apparently received 100 mg PO Metoprolol Succinate for a reported missed dose +and then was placed on Diltiazem 20 mg Push + GTT for Atrial Fibrillation with RVR and was receiving Azithromycin / Ceftriaxone at the time of her event for presumed Community Acquired Bacterial Pneumonia. She was also exceptionally acidotic with ABG of 7.05/54/59 and elevated Lactic Acid of 3.6. She was admitted to ICU, Diltiazem Gtt Discontinued. Sotalol was held due to renal issues as well as the circumstances of her PEA arrest. Medications were adjusted to reflect her drop in her LVEF. At time of discharge, she was to have been referred back to   "[facility] for management of her Persistent Atrial Fibrillation.         Chronic systolic congestive heart failure (Multi)    Overview     Chronic systolic heart failure likely related to atrial fibrillation  --Most recent echocardiogram showed recovered ejection fraction         Current Assessment & Plan     Maintained on amiodarone 200 mg daily for arrhythmia control in the setting of atrial fibrillation with RVR and sick sinus syndrome with significant pauses status post AICD/permanent pacemaker implantation  -- Sacubitril-valsartan 24/26 twice daily  -- Metoprolol succinate 100 mg twice daily         HLD (hyperlipidemia)    Overview     The ASCVD Risk score (Chu NOGUERA, et al., 2019) failed to calculate for the following reasons:    The patient has a prior MI or stroke diagnosis  Lab Results   Component Value Date    CHOL 170 06/02/2023    CHOL 190 11/07/2022    CHOL 178 01/04/2022     Lab Results   Component Value Date    HDL 38.8 (A) 06/02/2023    HDL 41.1 11/07/2022    HDL 36.6 (A) 01/04/2022     No results found for: \"LDLCALC\"  Lab Results   Component Value Date    TRIG 64 06/02/2023    TRIG 90 11/07/2022    TRIG 93 01/04/2022     No components found for: \"CHOLHDL\"             Presence of automatic (implantable) cardiac defibrillator    Overview     Device -  -- [employer] Mejia DR [id number]        Overall, doing well.  Would anticipate better rate control when she she has undergone radioablation.  Continue oral anticoagulation without interruption    Follow-up November 2024      Jose J Sands DO   Division of Cardiovascular Medicine  [facility]        "

## 2024-07-01 NOTE — ASSESSMENT & PLAN NOTE
Loaded on Amiodarone for Cardioversion   -- Had 12 Second Post Conversion Pauses   -- AICD implanted 11/10/2023   -- [employer] Rip Mejia DR [id number]  -- 2/29/2024, she returns for follow-up with sinus rhythm  -- Continue current medications including amiodarone 200 mg, metoprolol succinate 100 mg twice daily rivaroxaban 200 mg  -- Despite appropriate therapy, she has had continued breakthrough on amiodarone  -- Referred to electrophysiology for consideration of ablation therapy and is tentatively scheduled for later half of July 2024.  -- She has reduced metoprolol from 200 mg total daily dose down to 50 mg total daily dose.  I have asked her to try and increase back to at least 100 mg total daily dose for better rate control which she is in agreement to attempting.

## 2024-07-01 NOTE — PATIENT INSTRUCTIONS
"It was a pleasure seeing you today. I would like to see you back in clinic in November. You can call my office if questions arise between now and our next visit.     Today, we talked about your pacemaker and your heart     -- Please Continue ON the Amiodarone at 200 mg Daily. You still have a lot of Atrial Fibrillation [place]    -- Continue on Entresto at 24-26 mg Twice Daily . We will likely try and increase this dose over the next little bit (Probably our next appointment )     -- Continue on Metoprolol Succinate 100 mg Daily     -- Increase your Furosemide to 20 mg DAILY.. I have written your prescription to allow some extra doses if you need to take them.     -- Please do not doses of coagulations this ability to ablation.  If you do not miss doses, please let them know so that arrangements can be made for transesophageal echocardiogram in order to proceed with your ablation        Please stop smoking.    --Try to cut back on the number of cigarettes that you smoke.    -- Try to increase the interval between cigarettes.   -- Try to use substitutes such as sugar-free candy carrots,celery sticks as in between.  --  Once you are down to less than half a pack a day try to go cold turkey.   -- Try to designate areas in the your home as smoke-free areas.   -- Try to reduce the number of ashtrays and other reminders of smoking.   -- Try to keep any major something that you dislike next to your cigarette pack to try to develop a mental aversion to smoking          Below are some Heart Health Tips that we provide to all of our patients. I hope you fing them useful.     - If you are having problems with medications, consider looking at the following websites.   --  \"GoodRx\"   --  \"[facility]\"      - We are happy to supply written prescriptions if needed to allow you to obtain your medications from different pharmacies. Additionally, if you are having issues with mail order delivery, please let us " know. We can send a limited supply of your medications to your local pharmacy.     -  We recommend you follow a heart healthy diet. Watch food labels and try not to eat more than 2,500 mg of sodium per day. Avoid foods high in salt like processed meats (lunch meats, stevens, and sausage), processed foods (boxed dinners, canned soups), fried and fast foods. Monitor serving sizes and if the sodium per serving size is more than 200 mg, avoid those foods. If the sodium per serving size is between 100-200 mg, you can use those in limited quantities. Try to choose foods where the amount of sodium per serving size is less than 100 mg. Try to eat a diet rich in fruits and vegetables, whole grains, low fat dairy products, skinless poultry and fish, nuts, beans, non-tropical vegetable oils. Limit saturated fat, trans fat, sodium, red meats, and sugar-sweetened beverages.   Limit alcohol     -The combination of a reduced-calorie diet and increased physical activity is recommended. Adults should aim to get at least 150 minutes of moderate physical activity per week (30 minutes of moderate physical activities at least 5 days per week). Examples of moderate physical activities include brisk walking, swimming, aerobic dancing, heavy gardening, jumping rope, bicycling 10 MPH or faster, tennis, hiking uphill or with a heavy backpack. Please let us know if you would like to learn more about your nutrition and calories and additional options including weight loss programs to help you reach your goal.     -If you smoke, stop smoking. If you stop smoking you can help get rid of a major source of stress to your heart. Smoking makes your heart rate and blood pressure go up and increases your risk or developing cardiovascular diseases and worsen symptoms associated with heart failure.     -Obtain a BP monitor and monitor your BP daily. Check it around the same time each day; at least 1 hour after taking your medications. Record your BP in a  log and bring your log with you to your doctors appointment.     -F/u with your PCP as recommended. ;in

## 2024-07-09 DIAGNOSIS — I48.11 LONGSTANDING PERSISTENT ATRIAL FIBRILLATION (MULTI): ICD-10-CM

## 2024-07-09 DIAGNOSIS — Z01.818 PREOP TESTING: ICD-10-CM

## 2024-07-10 ENCOUNTER — LAB (OUTPATIENT)
Dept: LAB | Facility: LAB | Age: 69
End: 2024-07-10
Payer: MEDICARE

## 2024-07-10 DIAGNOSIS — I48.11 LONGSTANDING PERSISTENT ATRIAL FIBRILLATION (MULTI): ICD-10-CM

## 2024-07-10 DIAGNOSIS — Z01.818 PREOP TESTING: ICD-10-CM

## 2024-07-10 LAB
ANION GAP SERPL CALC-SCNC: 10 MMOL/L (ref 10–20)
ATRIAL RATE: 394 BPM
BUN SERPL-MCNC: 35 MG/DL (ref 6–23)
CALCIUM SERPL-MCNC: 8.8 MG/DL (ref 8.6–10.3)
CHLORIDE SERPL-SCNC: 108 MMOL/L (ref 98–107)
CO2 SERPL-SCNC: 26 MMOL/L (ref 21–32)
CREAT SERPL-MCNC: 2.01 MG/DL (ref 0.5–1.05)
EGFRCR SERPLBLD CKD-EPI 2021: 26 ML/MIN/1.73M*2
ERYTHROCYTE [DISTWIDTH] IN BLOOD BY AUTOMATED COUNT: 13 % (ref 11.5–14.5)
GLUCOSE SERPL-MCNC: 89 MG/DL (ref 74–99)
HCT VFR BLD AUTO: 40.2 % (ref 36–46)
HGB BLD-MCNC: 12.6 G/DL (ref 12–16)
MCH RBC QN AUTO: 31.9 PG (ref 26–34)
MCHC RBC AUTO-ENTMCNC: 31.3 G/DL (ref 32–36)
MCV RBC AUTO: 102 FL (ref 80–100)
NRBC BLD-RTO: 0 /100 WBCS (ref 0–0)
PLATELET # BLD AUTO: 93 X10*3/UL (ref 150–450)
POTASSIUM SERPL-SCNC: 5.2 MMOL/L (ref 3.5–5.3)
Q ONSET: 222 MS
QRS COUNT: 17 BEATS
QRS DURATION: 98 MS
QT INTERVAL: 298 MS
QTC CALCULATION(BAZETT): 393 MS
QTC FREDERICIA: 359 MS
R AXIS: 55 DEGREES
RBC # BLD AUTO: 3.95 X10*6/UL (ref 4–5.2)
SODIUM SERPL-SCNC: 139 MMOL/L (ref 136–145)
T AXIS: -22 DEGREES
T OFFSET: 371 MS
VENTRICULAR RATE: 105 BPM
WBC # BLD AUTO: 5.1 X10*3/UL (ref 4.4–11.3)

## 2024-07-10 PROCEDURE — 36415 COLL VENOUS BLD VENIPUNCTURE: CPT

## 2024-07-10 PROCEDURE — 80048 BASIC METABOLIC PNL TOTAL CA: CPT

## 2024-07-10 PROCEDURE — 85027 COMPLETE CBC AUTOMATED: CPT

## 2024-07-12 RX ORDER — SODIUM CHLORIDE 9 MG/ML
75 INJECTION, SOLUTION INTRAVENOUS CONTINUOUS
Status: CANCELLED | OUTPATIENT
Start: 2024-07-12

## 2024-07-14 NOTE — H&P
History Of Present Illness  Buffy Temple is a 68 y.o. female presenting with atrial fibrillation. PMH incldues SSS and NICM s/p ICD, PEA cardiac arrest 1/31/2020 in the setting of septic shock/ afib with RVR, chronic systolic HF, HLD, HTN, thrombocytopenia, hyperthyroidism, acute diverticulitis, embolic CVA, CKD, COPD, current tobacco user    Past Medical History:  Past Medical History:   Diagnosis Date    Essential (primary) hypertension 11/18/2022    Hypertension    Other conditions influencing health status 08/18/2013    Nontoxic Goiter    Personal history of other endocrine, nutritional and metabolic disease     History of hyperlipidemia    Personal history of other specified conditions     History of shortness of breath        Past Surgical History:  Past Surgical History:   Procedure Laterality Date    CARDIAC ELECTROPHYSIOLOGY PROCEDURE Left 11/10/2023    Procedure: ICD Dual Implant;  Surgeon: Po Pemberton MD;  Location: [facility] Cardiac Cath Lab;  Service: Electrophysiology;  Laterality: Left;    CARDIAC ELECTROPHYSIOLOGY PROCEDURE Left 11/10/2023    Procedure: PPM IMPLANT DUAL;  Surgeon: Po Pemberton MD;  Location: [facility] Cardiac Cath Lab;  Service: Electrophysiology;  Laterality: Left;    MR HEAD ANGIO WO IV CONTRAST  9/13/2015    MR HEAD ANGIO WO IV CONTRAST 9/13/2015 [facility] CLINICAL LEGACY    MR NECK ANGIO W IV CONTRAST  9/13/2015    MR NECK ANGIO W IV CONTRAST 9/13/2015 [facility] CLINICAL LEGACY    OTHER SURGICAL HISTORY  08/23/2013    Atrial Cardioversion          Social History:   reports that she has been smoking cigarettes. She has never used smokeless tobacco. She reports that she does not currently use alcohol. She reports that she does not currently use drugs.     Family History:  No family history on file.     Allergies:  No Known Allergies     Home Medications:  Current Outpatient Medications   Medication Instructions    amiodarone (PACERONE) 200 mg, oral, Daily    atorvastatin (LIPITOR) 40 mg,  oral, Daily    cholecalciferol (Vitamin D-3) 50 mcg (2,000 unit) capsule 1 capsule, oral, Daily    furosemide (Lasix) 20 mg tablet Take 1 tablet (20 mg) by mouth once a day on Sunday, Monday, Wednesday, Friday, and Saturday AND 2 tablets (40 mg) once a day on Tuesday and Thursday.    metoprolol succinate XL (TOPROL-XL) 100 mg, oral, Every 12 hours    naproxen sodium (ALEVE) 220 mg, oral, As needed    rivaroxaban (XARELTO) 20 mg, oral, Daily, Take with food.    sacubitriL-valsartan (Entresto) 24-26 mg tablet 1 tablet, oral, 2 times daily       Inpatient Medications:            Review of Systems   Constitutional: Negative.    HENT: Negative.     Eyes: Negative.    Respiratory: Negative.     Cardiovascular: Negative.    Gastrointestinal: Negative.    Endocrine: Negative.    Genitourinary: Negative.    Musculoskeletal: Negative.    Skin: Negative.    Allergic/Immunologic: Negative.    Neurological: Negative.    Hematological: Negative.    Psychiatric/Behavioral: Negative.            Physical Exam  Constitutional:       General: She is awake. She is not in acute distress.     Appearance: She is not ill-appearing.   Cardiovascular:      Rate and Rhythm: Regular rhythm. Bradycardia present.      Pulses: Normal pulses.           Radial pulses are 2+ on the right side and 2+ on the left side.        Dorsalis pedis pulses are 2+ on the right side and 2+ on the left side.      Heart sounds: Normal heart sounds. No murmur heard.  Pulmonary:      Effort: Pulmonary effort is normal.      Breath sounds: Normal breath sounds and air entry.   Abdominal:      General: Bowel sounds are normal.      Palpations: Abdomen is soft.      Tenderness: There is no abdominal tenderness.   Musculoskeletal:      Right lower leg: No edema.      Left lower leg: No edema.   Skin:     General: Skin is warm and dry.   Neurological:      General: No focal deficit present.      Mental Status: She is alert and oriented to person, place, and time.       "GCS: GCS eye subscore is 4. GCS verbal subscore is 5. GCS motor subscore is 6.   Psychiatric:         Mood and Affect: Mood normal.         Behavior: Behavior is cooperative.        Sedation Plan    ASA 3     Mallampati class: II.         Last Recorded Vitals  Blood pressure 179/81, pulse 54, temperature 36.6 °C (97.9 °F), temperature source Tympanic, resp. rate 15, height 1.753 m (5' 9.02\"), weight 83.9 kg (184 lb 15.5 oz), SpO2 100%.         Vitals from the Past 24 Hours  Heart Rate:  [54]   Temp:  [36.6 °C (97.9 °F)]   Resp:  [15]   BP: (179)/(81)   Height:  [175.3 cm (5' 9.02\")]   Weight:  [83.9 kg (184 lb 15.5 oz)]   SpO2:  [100 %]          Relevant Results    Labs    CBC:   Recent Labs     07/10/24  1145 12/18/23  1155 11/11/23  0626 06/02/23  0949 11/07/22  1127 01/04/22  1251   WBC 5.1 4.2* 5.0 4.7 5.3 4.3*   HGB 12.6 11.9* 12.6 15.7 15.5 15.3   HCT 40.2 39.1 39.7 50.5* 49.4* 49.2*   PLT 93* 93* 86* 122* 113* 121*   * 99 99 100 102* 100     BMP/CMP:   Recent Labs     07/10/24  1145 12/18/23  1155 11/11/23  0626 06/02/23  0949 11/07/22  1127 01/04/22  1251 11/17/21  1004 12/14/20  1542 06/30/20  0903 02/25/20  0605 02/24/20  1308    143 141 145 143 139   < > 142   < > 141 141   K 5.2 5.2 4.9 5.0 5.0 4.8   < > 4.6   < > 3.8 3.8   * 110* 113* 114* 110* 109*   < > 108*   < > 108* 105   BUN 35* 42* 26* 27* 22 23   < > 21   < > 9 9   CREATININE 2.01* 2.38* 1.96* 1.29* 1.25* 1.27*   < > 1.36*   < > 0.92 0.82   CO2 26 25 20* 25 25 24   < > 26   < > 26 29   CALCIUM 8.8 9.0 8.2* 9.9 9.5 8.8   < > 9.6   < > 9.2 9.4   PROT  --  5.9*  --  6.4  --  6.4  --  6.4  --  6.1* 6.2*   BILITOT  --  0.4  --  0.4  --  0.5  --  0.7  --  0.7 0.7   ALKPHOS  --  118  --  128  --  118  --  138*  --  145* 150*   ALT  --  7  --  6*  --  12  --  15  --  8 8   AST  --  12  --  11  --  18  --  21  --  18 17   GLUCOSE 89 83 129* 97 96 91   < > 88   < > 90 84    < > = values in this interval not displayed.      Lipid Panel: " "  Recent Labs     06/02/23  0949 11/07/22  1127 01/04/22  1251 11/17/21  1004 12/14/20  1542 01/31/20  0813   CHOL 170 190 178 157 163  --    HDL 38.8* 41.1 36.6* 33.5* 40.5  --    CHHDL 4.4 4.6 4.9 4.7 4.0  --    VLDL  --   --  19  --  13  --    TRIG 64 90 93  --  63 126     Cardiac       No lab exists for component: \"CK\", \"CKMBP\"   Hemoglobin A1C:   Recent Labs     11/06/23  0340   HGBA1C 6.1*     TSH/ Free T4:   Recent Labs     12/18/23  1155 01/04/22  1251 12/14/20  1542 04/15/20  1442   TSH <0.01* 1.19 0.83 0.75   FREET4 3.22*  --   --   --      Iron:   Recent Labs     06/02/23  0949 11/07/22  1127 11/17/21  1004 01/30/20  1712   TIBC  --  329  --   --    IRONSAT  --  29  --   --    BNP 50 29 63 223*     Coag:     ABO: No results found for: \"ABO\"    Past Cardiology Tests (Last 3 Years):    EKG:  Recent Labs     07/01/24  0840 06/25/24  0840 11/30/23  0913 11/15/23  0844   ATRRATE 394 61 57 73   VENTRATE 105 61 57 73   PRINT  --  168 188 196   QRSDUR 98 90 94 94   QTCFRED 359 410 432 431   QTCCALCB 393 410 428 445     Encounter Date: 07/01/24   ECG 12 lead (Clinic Performed)   Result Value    Ventricular Rate 105    Atrial Rate 394    QRS Duration 98    QT Interval 298    QTC Calculation(Bazett) 393    R Axis 55    T Axis -22    QRS Count 17    Q Onset 222    T Offset 371    QTC Fredericia 359    Narrative    Atrial fibrillation with rapid ventricular response  Septal infarct (cited on or before 29-FEB-2024)  Abnormal ECG  When compared with ECG of 25-JUN-2024 08:57,  Atrial fibrillation has replaced Sinus rhythm  Vent. rate has increased BY  44 BPM  Nonspecific T wave abnormality, worse in Inferior leads  Confirmed by Jose J Sands ([id number]) on 7/10/2024 4:38:34 PM     Echo:  Echocardiogram:   Echocardiogram     Study Date:       6/2/2023              PHYSICIAN INTERPRETATION:  Left Ventricle: The left ventricular systolic function is mildly to moderately decreased, with an estimated ejection fraction of 35-40%. " Estimated left ventricular mass is increased. There is global hypokinesis of the left ventricle with minor regional variations. The left ventricular cavity size is normal. The left ventricular septal wall thickness is normal. There is moderately increased left ventricular posterior wall thickness. There is moderate concentric left ventricular hypertrophy. Spectral Doppler shows an impaired relaxation pattern of left ventricular diastolic filling.  Left Atrium: The left atrium is moderate to severely dilated.  Right Ventricle: The right ventricle is normal in size. There is mildly reduced right ventricular systolic function. TAPSE 15 mm.  Right Atrium: The right atrium is normal in size.  Aortic Valve: The aortic valve appears structurally normal. There is no evidence of aortic valve regurgitation.  Mitral Valve: The mitral valve is mildly thickened. There is mild to moderate mitral annular calcification. There is moderate mitral valve regurgitation which is eccentrically directed.  Tricuspid Valve: The tricuspid valve is structurally normal. There is trace to mild tricuspid regurgitation.  Pulmonic Valve: The pulmonic valve is structurally normal. There is no indication of pulmonic valve regurgitation.  Pericardium: There is no pericardial effusion noted.  Aorta: The aortic root is normal.  Pulmonary Artery: The tricuspid regurgitant velocity is 2.32 m/s, and with an estimated right atrial pressure of 3 mmHg, the estimated pulmonary artery pressure is normal with the RVSP at 24.5 mmHg.  Systemic Veins: The inferior vena cava appears to be of normal size. There is IVC inspiratory collapse greater than 50%.  In comparison to the previous echocardiogram(s): Compared with study from 12/9/2021, there is a significant reduction in LVEF from 55-60% to 35-40%.      CONCLUSIONS:  1. Left ventricular systolic function is mildly to moderately decreased with a 35-40% estimated ejection fraction.  2. There is global hypokinesis  "of the left ventricle with minor regional variations.  3. Spectral Doppler shows an impaired relaxation pattern of left ventricular diastolic filling.  4. Increased LV mass.  5. There is moderate concentric left ventricular hypertrophy.  6. The left ventricular posterior wall thickness is moderately increased.  7. There is mildly reduced right ventricular systolic function.  8. The left atrium is moderate to severely dilated.  9. Moderate mitral valve regurgitation.  10. Compared with study from 12/9/2021, there is a significant reduction in LVEF from 55-60% to 35-40%.      Ejection Fractions:  No results found for: \"EF\"  Cath:  Coronary Angiography: No results found for this or any previous visit from the past 1800 days.    Right Heart Cath: No results found for this or any previous visit from the past 1800 days.    Stress Test:  Nuclear:No results found for this or any previous visit from the past 1800 days.    Metabolic Stress: No results found for this or any previous visit from the past 1800 days.    Cardiac Imaging:  Cardiac Scoring: No results found for this or any previous visit from the past 1800 days.    Cardiac MRI: No results found for this or any previous visit from the past 1800 days.         Assessment/Plan  Assessment/Plan   Active Problems:  There are no active Hospital Problems.        Atrial fibrillation  -RFCA with Dr. Pemberton on 7/15/24  -plan to dc home on protonix prophylaxis         NP discussed with Dr. Pemberton regarding plan of care/ discharge plan      I spent 30 minutes in the professional and overall care of this patient.      William Velasquez, APRN-CNP, DNP    "

## 2024-07-15 ENCOUNTER — APPOINTMENT (OUTPATIENT)
Dept: CARDIOLOGY | Facility: HOSPITAL | Age: 69
End: 2024-07-15
Payer: MEDICARE

## 2024-07-15 ENCOUNTER — ANESTHESIA EVENT (OUTPATIENT)
Dept: CARDIOLOGY | Facility: HOSPITAL | Age: 69
End: 2024-07-15
Payer: MEDICARE

## 2024-07-15 ENCOUNTER — PHARMACY VISIT (OUTPATIENT)
Dept: PHARMACY | Facility: CLINIC | Age: 69
End: 2024-07-15
Payer: MEDICARE

## 2024-07-15 ENCOUNTER — HOSPITAL ENCOUNTER (OUTPATIENT)
Facility: HOSPITAL | Age: 69
Setting detail: OUTPATIENT SURGERY
Discharge: HOME | End: 2024-07-15
Attending: INTERNAL MEDICINE | Admitting: INTERNAL MEDICINE
Payer: MEDICARE

## 2024-07-15 ENCOUNTER — ANESTHESIA (OUTPATIENT)
Dept: CARDIOLOGY | Facility: HOSPITAL | Age: 69
End: 2024-07-15
Payer: MEDICARE

## 2024-07-15 VITALS
DIASTOLIC BLOOD PRESSURE: 60 MMHG | RESPIRATION RATE: 14 BRPM | WEIGHT: 184.97 LBS | TEMPERATURE: 96.8 F | BODY MASS INDEX: 27.4 KG/M2 | HEIGHT: 69 IN | SYSTOLIC BLOOD PRESSURE: 174 MMHG | OXYGEN SATURATION: 98 % | HEART RATE: 54 BPM

## 2024-07-15 DIAGNOSIS — Z01.810 PRE-OPERATIVE CARDIOVASCULAR EXAMINATION, ICD IN PLACE: ICD-10-CM

## 2024-07-15 DIAGNOSIS — I48.91 UNSPECIFIED ATRIAL FIBRILLATION (MULTI): Primary | ICD-10-CM

## 2024-07-15 DIAGNOSIS — Z98.890 STATUS POST RADIOFREQUENCY ABLATION (RFA) OPERATION FOR ARRHYTHMIA: ICD-10-CM

## 2024-07-15 DIAGNOSIS — I47.20 VENTRICULAR TACHYARRHYTHMIA (MULTI): ICD-10-CM

## 2024-07-15 DIAGNOSIS — Z86.79 STATUS POST RADIOFREQUENCY ABLATION (RFA) OPERATION FOR ARRHYTHMIA: ICD-10-CM

## 2024-07-15 DIAGNOSIS — Z95.810 PRE-OPERATIVE CARDIOVASCULAR EXAMINATION, ICD IN PLACE: ICD-10-CM

## 2024-07-15 LAB — BODY SURFACE AREA: 2.02 M2

## 2024-07-15 PROCEDURE — 93287 PERI-PX DEVICE EVAL & PRGR: CPT

## 2024-07-15 PROCEDURE — 3700000002 HC GENERAL ANESTHESIA TIME - EACH INCREMENTAL 1 MINUTE: Performed by: INTERNAL MEDICINE

## 2024-07-15 PROCEDURE — C1730 CATH, EP, 19 OR FEW ELECT: HCPCS | Performed by: INTERNAL MEDICINE

## 2024-07-15 PROCEDURE — 99223 1ST HOSP IP/OBS HIGH 75: CPT | Performed by: NURSE PRACTITIONER

## 2024-07-15 PROCEDURE — 2500000004 HC RX 250 GENERAL PHARMACY W/ HCPCS (ALT 636 FOR OP/ED): Performed by: ANESTHESIOLOGY

## 2024-07-15 PROCEDURE — 7100000002 HC RECOVERY ROOM TIME - EACH INCREMENTAL 1 MINUTE: Performed by: INTERNAL MEDICINE

## 2024-07-15 PROCEDURE — C1759 CATH, INTRA ECHOCARDIOGRAPHY: HCPCS | Performed by: INTERNAL MEDICINE

## 2024-07-15 PROCEDURE — 93656 COMPRE EP EVAL ABLTJ ATR FIB: CPT | Performed by: INTERNAL MEDICINE

## 2024-07-15 PROCEDURE — C1893 INTRO/SHEATH, FIXED,NON-PEEL: HCPCS | Performed by: INTERNAL MEDICINE

## 2024-07-15 PROCEDURE — 3700000001 HC GENERAL ANESTHESIA TIME - INITIAL BASE CHARGE: Performed by: INTERNAL MEDICINE

## 2024-07-15 PROCEDURE — 7100000009 HC PHASE TWO TIME - INITIAL BASE CHARGE: Performed by: INTERNAL MEDICINE

## 2024-07-15 PROCEDURE — 85347 COAGULATION TIME ACTIVATED: CPT

## 2024-07-15 PROCEDURE — 2500000005 HC RX 250 GENERAL PHARMACY W/O HCPCS: Performed by: INTERNAL MEDICINE

## 2024-07-15 PROCEDURE — C1731 CATH, EP, 20 OR MORE ELEC: HCPCS | Performed by: INTERNAL MEDICINE

## 2024-07-15 PROCEDURE — 2500000004 HC RX 250 GENERAL PHARMACY W/ HCPCS (ALT 636 FOR OP/ED): Performed by: ANESTHESIOLOGIST ASSISTANT

## 2024-07-15 PROCEDURE — C1732 CATH, EP, DIAG/ABL, 3D/VECT: HCPCS | Performed by: INTERNAL MEDICINE

## 2024-07-15 PROCEDURE — 2720000007 HC OR 272 NO HCPCS: Performed by: INTERNAL MEDICINE

## 2024-07-15 PROCEDURE — 93662 INTRACARDIAC ECG (ICE): CPT | Performed by: INTERNAL MEDICINE

## 2024-07-15 PROCEDURE — RXMED WILLOW AMBULATORY MEDICATION CHARGE

## 2024-07-15 PROCEDURE — 7100000001 HC RECOVERY ROOM TIME - INITIAL BASE CHARGE: Performed by: INTERNAL MEDICINE

## 2024-07-15 PROCEDURE — 2500000005 HC RX 250 GENERAL PHARMACY W/O HCPCS: Performed by: ANESTHESIOLOGIST ASSISTANT

## 2024-07-15 PROCEDURE — 7100000010 HC PHASE TWO TIME - EACH INCREMENTAL 1 MINUTE: Performed by: INTERNAL MEDICINE

## 2024-07-15 PROCEDURE — 93621 COMP EP EVL L PAC&REC C SINS: CPT | Performed by: INTERNAL MEDICINE

## 2024-07-15 RX ORDER — LIDOCAINE HYDROCHLORIDE 10 MG/ML
INJECTION, SOLUTION EPIDURAL; INFILTRATION; INTRACAUDAL; PERINEURAL AS NEEDED
Status: DISCONTINUED | OUTPATIENT
Start: 2024-07-15 | End: 2024-07-15 | Stop reason: HOSPADM

## 2024-07-15 RX ORDER — ONDANSETRON HYDROCHLORIDE 2 MG/ML
INJECTION, SOLUTION INTRAVENOUS AS NEEDED
Status: DISCONTINUED | OUTPATIENT
Start: 2024-07-15 | End: 2024-07-15

## 2024-07-15 RX ORDER — ROCURONIUM BROMIDE 10 MG/ML
INJECTION, SOLUTION INTRAVENOUS AS NEEDED
Status: DISCONTINUED | OUTPATIENT
Start: 2024-07-15 | End: 2024-07-15

## 2024-07-15 RX ORDER — SODIUM CHLORIDE, SODIUM LACTATE, POTASSIUM CHLORIDE, CALCIUM CHLORIDE 600; 310; 30; 20 MG/100ML; MG/100ML; MG/100ML; MG/100ML
100 INJECTION, SOLUTION INTRAVENOUS CONTINUOUS
Status: DISCONTINUED | OUTPATIENT
Start: 2024-07-15 | End: 2024-07-18 | Stop reason: HOSPADM

## 2024-07-15 RX ORDER — PANTOPRAZOLE SODIUM 40 MG/1
40 TABLET, DELAYED RELEASE ORAL DAILY
Qty: 30 TABLET | Refills: 0 | Status: SHIPPED | OUTPATIENT
Start: 2024-07-15 | End: 2024-08-14

## 2024-07-15 RX ORDER — LIDOCAINE HYDROCHLORIDE 10 MG/ML
0.1 INJECTION INFILTRATION; PERINEURAL ONCE
Status: DISCONTINUED | OUTPATIENT
Start: 2024-07-15 | End: 2024-07-18 | Stop reason: HOSPADM

## 2024-07-15 RX ORDER — PHENYLEPHRINE 10 MG/250 ML(40 MCG/ML)IN 0.9 % SOD.CHLORIDE INTRAVENOUS
CONTINUOUS PRN
Status: DISCONTINUED | OUTPATIENT
Start: 2024-07-15 | End: 2024-07-15

## 2024-07-15 RX ORDER — OXYCODONE HYDROCHLORIDE 5 MG/1
5 TABLET ORAL EVERY 4 HOURS PRN
Status: DISCONTINUED | OUTPATIENT
Start: 2024-07-15 | End: 2024-07-18 | Stop reason: HOSPADM

## 2024-07-15 RX ORDER — HEPARIN SODIUM 1000 [USP'U]/ML
INJECTION, SOLUTION INTRAVENOUS; SUBCUTANEOUS AS NEEDED
Status: DISCONTINUED | OUTPATIENT
Start: 2024-07-15 | End: 2024-07-15

## 2024-07-15 RX ORDER — PROTAMINE SULFATE 10 MG/ML
INJECTION, SOLUTION INTRAVENOUS AS NEEDED
Status: DISCONTINUED | OUTPATIENT
Start: 2024-07-15 | End: 2024-07-15

## 2024-07-15 RX ORDER — ACETAMINOPHEN 325 MG/1
650 TABLET ORAL EVERY 4 HOURS PRN
Status: DISCONTINUED | OUTPATIENT
Start: 2024-07-15 | End: 2024-07-18 | Stop reason: HOSPADM

## 2024-07-15 RX ORDER — HYDROMORPHONE HYDROCHLORIDE 0.2 MG/ML
0.2 INJECTION INTRAMUSCULAR; INTRAVENOUS; SUBCUTANEOUS EVERY 5 MIN PRN
Status: DISCONTINUED | OUTPATIENT
Start: 2024-07-15 | End: 2024-07-18 | Stop reason: HOSPADM

## 2024-07-15 RX ORDER — LIDOCAINE HYDROCHLORIDE 20 MG/ML
INJECTION, SOLUTION EPIDURAL; INFILTRATION; INTRACAUDAL; PERINEURAL AS NEEDED
Status: DISCONTINUED | OUTPATIENT
Start: 2024-07-15 | End: 2024-07-15

## 2024-07-15 RX ORDER — ONDANSETRON HYDROCHLORIDE 2 MG/ML
4 INJECTION, SOLUTION INTRAVENOUS ONCE AS NEEDED
Status: DISCONTINUED | OUTPATIENT
Start: 2024-07-15 | End: 2024-07-18 | Stop reason: HOSPADM

## 2024-07-15 RX ORDER — SODIUM CHLORIDE 9 MG/ML
INJECTION, SOLUTION INTRAVENOUS CONTINUOUS PRN
Status: DISCONTINUED | OUTPATIENT
Start: 2024-07-15 | End: 2024-07-15

## 2024-07-15 RX ORDER — PHENYLEPHRINE HCL IN 0.9% NACL 1 MG/10 ML
SYRINGE (ML) INTRAVENOUS AS NEEDED
Status: DISCONTINUED | OUTPATIENT
Start: 2024-07-15 | End: 2024-07-15

## 2024-07-15 RX ORDER — ONDANSETRON HYDROCHLORIDE 2 MG/ML
4 INJECTION, SOLUTION INTRAVENOUS EVERY 8 HOURS PRN
Status: DISCONTINUED | OUTPATIENT
Start: 2024-07-15 | End: 2024-07-18 | Stop reason: HOSPADM

## 2024-07-15 RX ORDER — ONDANSETRON 4 MG/1
4 TABLET, FILM COATED ORAL EVERY 8 HOURS PRN
Status: DISCONTINUED | OUTPATIENT
Start: 2024-07-15 | End: 2024-07-18 | Stop reason: HOSPADM

## 2024-07-15 RX ORDER — MIDAZOLAM HYDROCHLORIDE 1 MG/ML
INJECTION INTRAMUSCULAR; INTRAVENOUS AS NEEDED
Status: DISCONTINUED | OUTPATIENT
Start: 2024-07-15 | End: 2024-07-15

## 2024-07-15 RX ORDER — PROPOFOL 10 MG/ML
INJECTION, EMULSION INTRAVENOUS AS NEEDED
Status: DISCONTINUED | OUTPATIENT
Start: 2024-07-15 | End: 2024-07-15

## 2024-07-15 RX ORDER — HEPARIN SODIUM 10000 [USP'U]/100ML
INJECTION, SOLUTION INTRAVENOUS CONTINUOUS PRN
Status: DISCONTINUED | OUTPATIENT
Start: 2024-07-15 | End: 2024-07-15

## 2024-07-15 SDOH — HEALTH STABILITY: MENTAL HEALTH: CURRENT SMOKER: 0

## 2024-07-15 ASSESSMENT — PAIN SCALES - GENERAL
PAINLEVEL_OUTOF10: 0 - NO PAIN
PAINLEVEL_OUTOF10: 10 - WORST POSSIBLE PAIN
PAINLEVEL_OUTOF10: 10 - WORST POSSIBLE PAIN
PAINLEVEL_OUTOF10: 0 - NO PAIN
PAINLEVEL_OUTOF10: 10 - WORST POSSIBLE PAIN
PAINLEVEL_OUTOF10: 0 - NO PAIN
PAIN_LEVEL: 4

## 2024-07-15 ASSESSMENT — PAIN - FUNCTIONAL ASSESSMENT
PAIN_FUNCTIONAL_ASSESSMENT: 0-10

## 2024-07-15 ASSESSMENT — ENCOUNTER SYMPTOMS
CARDIOVASCULAR NEGATIVE: 1
MUSCULOSKELETAL NEGATIVE: 1
ALLERGIC/IMMUNOLOGIC NEGATIVE: 1
EYES NEGATIVE: 1
PSYCHIATRIC NEGATIVE: 1
GASTROINTESTINAL NEGATIVE: 1
CONSTITUTIONAL NEGATIVE: 1
RESPIRATORY NEGATIVE: 1
HEMATOLOGIC/LYMPHATIC NEGATIVE: 1
NEUROLOGICAL NEGATIVE: 1
ENDOCRINE NEGATIVE: 1

## 2024-07-15 ASSESSMENT — COLUMBIA-SUICIDE SEVERITY RATING SCALE - C-SSRS
2. HAVE YOU ACTUALLY HAD ANY THOUGHTS OF KILLING YOURSELF?: NO
6. HAVE YOU EVER DONE ANYTHING, STARTED TO DO ANYTHING, OR PREPARED TO DO ANYTHING TO END YOUR LIFE?: NO
1. IN THE PAST MONTH, HAVE YOU WISHED YOU WERE DEAD OR WISHED YOU COULD GO TO SLEEP AND NOT WAKE UP?: NO

## 2024-07-15 ASSESSMENT — PAIN DESCRIPTION - LOCATION
LOCATION: GROIN
LOCATION: GROIN

## 2024-07-15 ASSESSMENT — PAIN DESCRIPTION - DESCRIPTORS: DESCRIPTORS: ACHING;JABBING;SHARP

## 2024-07-15 ASSESSMENT — PAIN DESCRIPTION - ORIENTATION
ORIENTATION: RIGHT;LEFT
ORIENTATION: RIGHT;LEFT

## 2024-07-15 NOTE — ANESTHESIA PROCEDURE NOTES
Airway  Date/Time: 7/15/2024 10:27 AM  Urgency: elective    Airway not difficult    Staffing  Performed: KELSEY   Authorized by: Jared Gray MD    Performed by: KELSEY Tapia  Patient location during procedure: OR    Indications and Patient Condition  Indications for airway management: anesthesia and airway protection  Spontaneous Ventilation: absent  Sedation level: deep  Preoxygenated: yes  Patient position: sniffing  MILS not maintained throughout  Mask difficulty assessment: 1 - vent by mask  Planned trial extubation    Final Airway Details  Final airway type: endotracheal airway      Successful airway: ETT  Cuffed: yes   Successful intubation technique: direct laryngoscopy  Endotracheal tube insertion site: oral  Blade: Belén  Blade size: #3  ETT size (mm): 7.0  Cormack-Lehane Classification: grade IIa - partial view of glottis  Placement verified by: chest auscultation, capnometry and palpation of cuff   Measured from: teeth  ETT to teeth (cm): 22  Number of attempts at approach: 1

## 2024-07-15 NOTE — SIGNIFICANT EVENT
EDWARD Thomas to the bedside to remove bilat. Groin sutures.  Pt tolerated well.  No bleeding from either site.  Pressure dressings to both sites

## 2024-07-15 NOTE — ANESTHESIA POSTPROCEDURE EVALUATION
Patient: Buffy Temple    Procedure Summary       Date: 07/15/24 Room / Location: [facility]    Anesthesia Start: 1005 Anesthesia Stop: 1243    Procedure: Ablation A-Fib Diagnosis:       Paroxysmal atrial fibrillation (Multi)      (Unspecified atrial fibrillation (Multi) [I48.91])    Providers: Po Pemberton MD Responsible Provider: Jared Gray MD    Anesthesia Type: general ASA Status: 3            Anesthesia Type: general    Vitals Value Taken Time   BP  07/15/24 1253   Temp  07/15/24 1253   Pulse  07/15/24 1253   Resp  07/15/24 1253   SpO2  07/15/24 1253       Anesthesia Post Evaluation    Patient location during evaluation: PACU  Patient participation: complete - patient participated  Level of consciousness: awake and alert  Pain score: 4  Pain management: adequate  Airway patency: patent  Cardiovascular status: acceptable  Respiratory status: acceptable  Hydration status: acceptable  Postoperative Nausea and Vomiting: none      There were no known notable events for this encounter.

## 2024-07-15 NOTE — DISCHARGE INSTRUCTIONS
Please do not miss any doses of your anticoagulant (Xarelto). This is very important to prevent the risk of stroke.     Please resume Xarelto tonight 7/15/24

## 2024-07-15 NOTE — ANESTHESIA PROCEDURE NOTES
Peripheral IV  Date/Time: 7/15/2024 10:40 AM      Placement  Needle size: 20 G  Laterality: left  Location: hand  Local anesthetic: none  Site prep: alcohol  Technique: anatomical landmarks  Attempts: 1

## 2024-07-15 NOTE — ANESTHESIA PROCEDURE NOTES
Arterial Line:    Date/Time: 7/15/2024 10:52 AM    Staffing  Performed: attending   Authorized by: Jared Gray MD    Performed by: KELSEY Tapia    An arterial line was placed. Procedure performed using surface landmarks.in the OR for the following indication(s): continuous blood pressure monitoring.    A Catheter size: 18 gauge. (size), 1 and 3/4 inch (length), Angiocath (type) catheter was placed into the Right brachial artery, secured by Tegaderm   Seldinger technique used.  Events:  patient tolerated procedure well with no complications.

## 2024-07-15 NOTE — ANESTHESIA PREPROCEDURE EVALUATION
Nutrition Problem #1: Inadequate oral intake  Intervention: Food and/or Nutrient Delivery: Continue Current Diet, Start Oral Nutrition Supplement  Nutritional Goals: PO intakes to meet % of estiamted nutrition needs Patient: Buffy Temple    Procedure Information       Date/Time: 07/15/24 1000    Procedure: Ablation A-Fib    Location: [facility]    Providers: Po Pemberton MD            Relevant Problems   Cardiac   (+) Atrial fibrillation (Multi)   (+) Atrial flutter (Multi)   (+) Cardiac arrest with pulseless electrical activity (Multi)   (+) Chronic systolic congestive heart failure (Multi)   (+) HLD (hyperlipidemia)   (+) Hypertension   (+) Mesenteric artery stenosis (Multi)   (+) Presence of automatic (implantable) cardiac defibrillator   (+) Sick sinus syndrome (Multi)      Pulmonary   (+) COPD (chronic obstructive pulmonary disease) (Multi)   (+) Dyspnea on exertion      Neuro   (+) Embolic stroke (Multi)      Liver   (+) Cholelithiasis      Endocrine   (+) Hyperthyroidism      Hematology   (+) Thrombocytopenia (CMS-HCC)      Musculoskeletal   (+) Left knee DJD      HEENT   (+) Acute sinusitis      Skin   (+) Dermatitis, eczematoid       Clinical information reviewed:    Allergies  Meds               NPO Detail:  NPO/Void Status  Carbohydrate Drink Given Prior to Surgery? : N  Date of Last Liquid: 07/15/24  Time of Last Liquid: 0700  Date of Last Solid: 07/14/24  Time of Last Solid: 1730  Last Intake Type: Clear fluids  Time of Last Void: 0902         Physical Exam    Airway  Mallampati: I  TM distance: >3 FB  Neck ROM: full     Cardiovascular - normal exam     Dental - normal exam     Pulmonary - normal exam     Abdominal - normal exam         Anesthesia Plan    History of general anesthesia?: yes  History of complications of general anesthesia?: no    ASA 3     general     The patient is not a current smoker.  Patient was not previously instructed to abstain from smoking on day of procedure.  Patient did not smoke on day of procedure.    intravenous induction   Postoperative administration of opioids is intended.  Anesthetic plan and risks discussed with patient.  Use of blood  products discussed with patient who.    Plan discussed with CAA.

## 2024-07-16 ENCOUNTER — APPOINTMENT (OUTPATIENT)
Dept: CARDIOLOGY | Facility: HOSPITAL | Age: 69
End: 2024-07-16
Payer: MEDICARE

## 2024-07-16 LAB
ACT BLD: 308 SEC (ref 96–152)
ACT BLD: 319 SEC (ref 96–152)
ACT BLD: 326 SEC (ref 96–152)
ATRIAL RATE: 54 BPM
P AXIS: 70 DEGREES
P OFFSET: 199 MS
P ONSET: 123 MS
PR INTERVAL: 202 MS
Q ONSET: 224 MS
QRS COUNT: 9 BEATS
QRS DURATION: 102 MS
QT INTERVAL: 488 MS
QTC CALCULATION(BAZETT): 462 MS
QTC FREDERICIA: 470 MS
R AXIS: 61 DEGREES
T AXIS: 52 DEGREES
T OFFSET: 468 MS
VENTRICULAR RATE: 54 BPM

## 2024-07-16 PROCEDURE — 93005 ELECTROCARDIOGRAM TRACING: CPT

## 2024-08-08 ENCOUNTER — DOCUMENTATION (OUTPATIENT)
Dept: PRIMARY CARE | Facility: CLINIC | Age: 69
End: 2024-08-08
Payer: MEDICARE

## 2024-08-08 ENCOUNTER — HOSPITAL ENCOUNTER (OUTPATIENT)
Dept: RADIOLOGY | Facility: CLINIC | Age: 69
Discharge: HOME | End: 2024-08-08
Payer: MEDICARE

## 2024-08-08 DIAGNOSIS — Z13.820 OSTEOPOROSIS SCREENING: ICD-10-CM

## 2024-08-08 DIAGNOSIS — Z13.820 ENCOUNTER FOR OSTEOPOROSIS SCREENING IN ASYMPTOMATIC POSTMENOPAUSAL PATIENT: ICD-10-CM

## 2024-08-08 DIAGNOSIS — Z78.0 ENCOUNTER FOR OSTEOPOROSIS SCREENING IN ASYMPTOMATIC POSTMENOPAUSAL PATIENT: ICD-10-CM

## 2024-08-08 PROCEDURE — 77080 DXA BONE DENSITY AXIAL: CPT

## 2024-08-08 PROCEDURE — 77080 DXA BONE DENSITY AXIAL: CPT | Performed by: RADIOLOGY

## 2024-08-08 ASSESSMENT — LIFESTYLE VARIABLES
CURRENT_SMOKER: Y
3_OR_MORE_DRINKS_PER_DAY: N

## 2024-08-26 ENCOUNTER — APPOINTMENT (OUTPATIENT)
Dept: CARDIOLOGY | Facility: HOSPITAL | Age: 69
End: 2024-08-26
Payer: MEDICARE

## 2024-08-26 DIAGNOSIS — I48.11 LONGSTANDING PERSISTENT ATRIAL FIBRILLATION (MULTI): Primary | ICD-10-CM

## 2024-08-26 DIAGNOSIS — I42.8 OTHER CARDIOMYOPATHY (MULTI): ICD-10-CM

## 2024-09-03 ENCOUNTER — APPOINTMENT (OUTPATIENT)
Dept: PRIMARY CARE | Facility: CLINIC | Age: 69
End: 2024-09-03
Payer: MEDICARE

## 2024-09-03 VITALS
HEART RATE: 68 BPM | TEMPERATURE: 98.5 F | WEIGHT: 184.2 LBS | DIASTOLIC BLOOD PRESSURE: 70 MMHG | RESPIRATION RATE: 16 BRPM | BODY MASS INDEX: 27.19 KG/M2 | SYSTOLIC BLOOD PRESSURE: 130 MMHG

## 2024-09-03 DIAGNOSIS — M85.80 OSTEOPENIA, UNSPECIFIED LOCATION: ICD-10-CM

## 2024-09-03 DIAGNOSIS — E04.2 MULTINODULAR GOITER: Primary | ICD-10-CM

## 2024-09-03 DIAGNOSIS — I73.9 CLAUDICATION OF BOTH LOWER EXTREMITIES (CMS-HCC): ICD-10-CM

## 2024-09-03 DIAGNOSIS — E78.49 OTHER HYPERLIPIDEMIA: ICD-10-CM

## 2024-09-03 DIAGNOSIS — I10 PRIMARY HYPERTENSION: ICD-10-CM

## 2024-09-03 PROBLEM — Z86.39 HISTORY OF ELEVATED LIPIDS: Status: ACTIVE | Noted: 2024-09-03

## 2024-09-03 PROBLEM — K55.1 MESENTERIC ARTERY STENOSIS (MULTI): Status: RESOLVED | Noted: 2023-12-18 | Resolved: 2024-09-03

## 2024-09-03 PROBLEM — N18.31 STAGE 3A CHRONIC KIDNEY DISEASE (MULTI): Status: ACTIVE | Noted: 2024-09-03

## 2024-09-03 PROBLEM — H01.009 BLEPHARITIS: Status: ACTIVE | Noted: 2024-09-03

## 2024-09-03 PROCEDURE — 3075F SYST BP GE 130 - 139MM HG: CPT | Performed by: INTERNAL MEDICINE

## 2024-09-03 PROCEDURE — 3078F DIAST BP <80 MM HG: CPT | Performed by: INTERNAL MEDICINE

## 2024-09-03 PROCEDURE — 1159F MED LIST DOCD IN RCRD: CPT | Performed by: INTERNAL MEDICINE

## 2024-09-03 PROCEDURE — 99214 OFFICE O/P EST MOD 30 MIN: CPT | Performed by: INTERNAL MEDICINE

## 2024-09-03 PROCEDURE — 4004F PT TOBACCO SCREEN RCVD TLK: CPT | Performed by: INTERNAL MEDICINE

## 2024-09-03 NOTE — PROGRESS NOTES
Buffy Temple is a 68 y.o. female   Patient with a past medical history of HTN, HLD, CKD IV, MNG, Pancytopenia, COPD, permanent atrial fibrillation, embolic CVA, chronic systolic heart failure, chronic nonischemic cardiomyopathy, sick sinus syndrome s/p pacemaker/AICD DJD, Thrombocytopenia, goiter, Osteopenia (DEXA 2026), Tobacco Abuse (June CT), mammogram in July, Tubular Adenoma (due 2025)     Discussed test results    Gets leg pain on both sides when she walks    No chest pain/  SOB/ dizziness  BM OK  Energy level ok  Appetite OK    Still smokes         Review of Systems     Constitutional: no fever, no chills, not feeling poorly, not feeling tired and no recent weight gain, no recent weight loss.   ENT: no earache, no hearing loss, no nosebleeds, no nasal discharge, no sore throat and no hoarseness.   Cardiovascular: the heart rate was not slow, the heart rate was not fast, no chest pain, no palpitations, no intermittent leg claudication and no lower extremity edema.   Respiratory: no cough, wheezing or shortness of breath at rest or exertion  Gastrointestinal: no abdominal pain, no constipation, no melena, no nausea, no diarrhea, no vomiting and no blood in stools.   Musculoskeletal: no arthralgias, no myalgias, no back pain, no joint swelling, no joint stiffness, no limb pain and no limb swelling.   Integumentary: no skin rashes, no skin lesions, no itching, no skin wound and no dry skin.   Neurological: no headache, no confusion, no numbness, no dizziness, no tingling and no fainting.   All other systems have been reviewed and are negative for complaint.     Current Outpatient Medications   Medication Instructions    amiodarone (PACERONE) 200 mg, oral, Daily    atorvastatin (LIPITOR) 40 mg, oral, Daily    cholecalciferol (Vitamin D-3) 50 mcg (2,000 unit) capsule 1 capsule, oral, Daily    furosemide (Lasix) 20 mg tablet Take 1 tablet (20 mg) by mouth once a day on Sunday, Monday, Wednesday, Friday, and  Saturday AND 2 tablets (40 mg) once a day on Tuesday and Thursday.    metoprolol succinate XL (TOPROL-XL) 100 mg, oral, Every 12 hours    naproxen sodium (ALEVE) 220 mg, oral, As needed    pantoprazole (PROTONIX) 40 mg, oral, Daily, Do not crush, chew, or split. Take for 30 days post procedure then stop    rivaroxaban (XARELTO) 20 mg, oral, Daily, Take with food.    sacubitriL-valsartan (Entresto) 24-26 mg tablet 1 tablet, oral, 2 times daily         Vitals:    09/03/24 1050   BP: 130/70   Pulse: 68   Resp: 16   Temp: 36.9 °C (98.5 °F)        Physical Exam     Constitutional   General appearance: Alert and in no acute distress.     Pulmonary   Respiratory assessment: No respiratory distress, normal respiratory rhythm and effort.    Auscultation of Lungs: Clear bilateral breath sounds.   Cardiovascular   Auscultation of heart: Apical pulse normal, heart rate and rhythm normal, normal S1 and S2, no murmurs and no pericardial rub.    Exam for edema: No peripheral edema.   Abdomen   Abdominal Exam: No bruits, normal bowel sounds, soft, non-tender, no abdominal mass palpated.    Liver and Spleen exam: No hepato-splenomegaly.   Musculoskeletal   Examination of gait: Normal.    Inspection of digits and nails: No clubbing or cyanosis of the fingernails.    Inspection/palpation of joints, bones and muscles: No joint swelling. Normal movement of all extremities.   Skin   Skin inspection: Normal skin color and pigmentation, normal skin turgor and no visible rash.   Neurologic   Cranial nerves: Nerves 2-12 were intact, no focal neuro defects.    Assessment/Plan          Patient with a past medical history of HTN, HLD, CKD IV, MNG, Pancytopenia, COPD, permanent atrial fibrillation, embolic CVA, chronic systolic heart failure, chronic nonischemic cardiomyopathy, sick sinus syndrome s/p pacemaker/AICD DJD, Thrombocytopenia, goiter, Osteopenia (DEXA 2026), Tobacco Abuse (June CT), mammogram in July, Tubular Adenoma (due 2025)  Patient with a past medical history of HTN, HLD, CKD IV, MNG, Pancytopenia, COPD, permanent atrial fibrillation, embolic CVA, chronic systolic heart failure, chronic nonischemic cardiomyopathy, sick sinus syndrome s/p pacemaker/AICD DJD, Thrombocytopenia, goiter, Osteopenia (DEXA 2026), mammogram in July, Tubular Adenoma (due 2025)     # Multinodular goiter  We will check a TSH free T4    #Osteopenia  Start calcium and vitamin D  600 mg plus vitamin D  Daily exercise especially weightbearing  Stop smoking    #Peripheral artery disease?  Patient has symptoms of claudication  We will check vascular studies    #Hypertension  Stable continue home medications    #Dyslipidemia  Checking lipid panel  Target LDL less than 70    #Chronic kidney disease stage IV  Check BMP    #Tinnitus  Sudden onset  Will be seeing ENT next month for workup

## 2024-09-16 ENCOUNTER — HOSPITAL ENCOUNTER (OUTPATIENT)
Dept: CARDIOLOGY | Facility: CLINIC | Age: 69
Discharge: HOME | End: 2024-09-16
Payer: MEDICARE

## 2024-09-16 DIAGNOSIS — Z95.810 CARDIAC DEFIBRILLATOR IN PLACE: ICD-10-CM

## 2024-09-16 DIAGNOSIS — I42.9 CARDIOMYOPATHY, UNSPECIFIED TYPE (MULTI): ICD-10-CM

## 2024-10-01 NOTE — PROGRESS NOTES
AUDIOLOGY ADULT EVALUATION      Name:  Buffy Temple   :  1955  Age:  68 y.o.  Date of Evaluation:  10/3/2024    Time: 08:30-    IMPRESSIONS     Today's testing revealed normal middle ear functioning, normal hearing sensitivity sloping to moderate sensorineural hearing loss and excellent word understanding, bilaterally.     Amplification needs: Binaural amplification is recommended due to bilateral sensorineural hearing loss; however, patient expressed lack of interest in amplification at this time.  Patient was provided with a copy of the hearing test in office.     RECOMMENDATIONS     Continue medical follow up with primary care provider and/or Ears Nose and Throat (ENT) provider as recommended.  Return for audiologic evaluation annually to monitor hearing sensitivity and assess middle ear status or sooner should concerns arise. The audiology department can be reached at [phone] to schedule an appointment.   Avoid exposure to loud sounds by moving away from the noise, turning down the volume, or wearing proper hearing protection correctly.  Consider use of tinnitus management options, which include but are not limited to the following: sound therapy (use of pleasant or calming sounds that diminish the presence of tinnitus); mindfulness, meditation, and breathing exercises; sleep hygiene; mental health evaluation and formal therapies; psychiatric management of psychopharmaceuticals; dental/orthodontia evaluation; dietary changes (reduction of sodium, caffeine, alcohol); lifestyle changes (exercise, etc.); use of hearing protection and avoidance of loud noise; and formal tinnitus therapies (Tinnitus Retraining Therapy/ TRT, Progressive Tinnitus Management, Tinnitus Activities Treatment, Cognitive Behavioral Therapy).  Appropriate communication techniques (face-to-face at a 4-6 foot maximum distance, reduced background noise, speech at normal volume and slower rate, good lighting, etc).      HISTORY     Ms. Temple, 68 y.o., was seen today for an initial audiologic evaluation in conjunction with an evaluation with ALEXSANDRA Tena, due to tinnitus, bilaterally.     History obtained from patient report and chart review.     Change in Hearing: denied  Difficult listening environments: none  Tinnitus:   Yes, in both ears; constant and described as buzzing sensation  Otalgia: denied  Aural Pressure/Fullness: denied  Recent Ear Infections/Otorrhea: denied  Dizziness: denied  History of Ear Surgeries: denied  History of Noise Exposure:   Yes, occupational noise exposure, hearing protection was reportedly worn intermittently  Hearing Aid Use: none    EVALUATION        TEST RESULTS     Otoscopic Evaluation:  Right Ear: Ear canal clear, tympanic membrane visualized.  Left Ear: Ear canal clear, tympanic membrane visualized.    Tympanometry (226 Hz): This test is an objective evaluation of middle ear function. CPT code: 47454   Right Ear: Type As, normal middle ear pressure and reduced tympanic membrane compliance.   Left Ear: Type A, middle ear pressure and tympanic membrane compliance within normal limits.     Acoustic Reflexes: This test is an objective measure of auditory and facial nerve pathways.   (Probe) Right Ear (ipsi right stimulus ear; contralateral left stimulus ear): (Ipsilateral) Responses present at expected levels for 500-4000 Hz.  (Probe) Left Ear (ipsi left stimulus ear; contralateral right stimulus ear):  (Ipsilateral) Responses present at expected levels for 500-4000 Hz.    Distortion Product Otoacoustic Emissions (DPOAE): This test is a measurement of responses which are generated by the cochlea when it is simultaneously stimulated by two pure tone frequencies. CPT code: 61007   Right Ear: Did not test.  Left Ear:  Did not test.  Present OAEs suggest normal or near cochlear outer hair cell function for corresponding frequency region(s). Absent OAEs with normal middle ear  function can be consistent with some degree of hearing loss. Assessment of cochlear outer hair cell function may be impacted by outer or middle ear function.    Test technique: Conventional Audiometry via headphones. This test is an evaluation hearing sensitivity via air and bone conduction and speech recognition testing. CPT code: 18038  Reliability:  good    Pure Tone Audiometry:     Right Ear: Hearing sensitivity within normal limits for 125-500 Hz, sloping to moderate sensorineural hearing loss through 8000 Hz.   Left Ear: Hearing sensitivity within normal limits for 125-500 Hz, sloping to moderate sensorineural hearing loss through 8000 Hz.     Speech Audiometry:   Right Ear: Speech Reception Threshold (SRT) was obtained at 25 dB HL. This is in good agreement with three frequency Pure Tone Average.   Word Recognition scores were excellent (96%) in quiet when words were presented at 65 dB HL. These results are based on [facility] Auditory Test No.6 (NU-6) (N=25).  Left Ear:  Speech Reception Threshold (SRT) was obtained at 20 dB HL. This is in good agreement with three frequency Pure Tone Average.   Word Recognition scores were excellent (96%) in quiet when words were presented at 65 dB HL. These results are based on [facility] Auditory Test No.6 (NU-6) (N=25).     Comparison of today's results with previous test results: No previous results available.      NAIN Pool, CCC-A  Licensed Clinical Audiologist    Degree of   Hearing Sensitivity dB Range   Within Normal Limits (WNL) 0 - 20   Slight 25   Mild 26 - 40   Moderate 41 - 55   Moderately-Severe 56 - 70   Severe 71 - 90   Profound 91 +     Key   CHL Conductive Hearing Loss   ECV Ear Canal Volume   HA Hearing Aid   NIHL Noise-Induced Hearing Loss   PTA Pure Tone Average   SNHL Sensorineural Hearing Loss   TM Tympanic Membrane   WNL Within Normal Limits

## 2024-10-03 ENCOUNTER — APPOINTMENT (OUTPATIENT)
Dept: OTOLARYNGOLOGY | Facility: CLINIC | Age: 69
End: 2024-10-03
Payer: MEDICARE

## 2024-10-03 ENCOUNTER — CLINICAL SUPPORT (OUTPATIENT)
Dept: AUDIOLOGY | Facility: CLINIC | Age: 69
End: 2024-10-03
Payer: MEDICARE

## 2024-10-03 VITALS — HEIGHT: 69 IN | WEIGHT: 188.3 LBS | TEMPERATURE: 98.1 F | BODY MASS INDEX: 27.89 KG/M2

## 2024-10-03 DIAGNOSIS — H90.3 SENSORINEURAL HEARING LOSS (SNHL) OF BOTH EARS: ICD-10-CM

## 2024-10-03 DIAGNOSIS — H61.23 BILATERAL IMPACTED CERUMEN: Primary | ICD-10-CM

## 2024-10-03 DIAGNOSIS — H90.3 SENSORINEURAL HEARING LOSS, BILATERAL: Primary | ICD-10-CM

## 2024-10-03 DIAGNOSIS — H93.13 TINNITUS OF BOTH EARS: ICD-10-CM

## 2024-10-03 PROCEDURE — 4004F PT TOBACCO SCREEN RCVD TLK: CPT | Performed by: NURSE PRACTITIONER

## 2024-10-03 PROCEDURE — 92557 COMPREHENSIVE HEARING TEST: CPT

## 2024-10-03 PROCEDURE — 1160F RVW MEDS BY RX/DR IN RCRD: CPT | Performed by: NURSE PRACTITIONER

## 2024-10-03 PROCEDURE — 3008F BODY MASS INDEX DOCD: CPT | Performed by: NURSE PRACTITIONER

## 2024-10-03 PROCEDURE — 92550 TYMPANOMETRY & REFLEX THRESH: CPT

## 2024-10-03 PROCEDURE — 1159F MED LIST DOCD IN RCRD: CPT | Performed by: NURSE PRACTITIONER

## 2024-10-03 PROCEDURE — 69210 REMOVE IMPACTED EAR WAX UNI: CPT | Performed by: NURSE PRACTITIONER

## 2024-10-03 PROCEDURE — 99204 OFFICE O/P NEW MOD 45 MIN: CPT | Performed by: NURSE PRACTITIONER

## 2024-10-03 NOTE — PROGRESS NOTES
Subjective   Patient ID: Buffy Temple is a 68 y.o. female who presents for Cerumen Impaction (bilateral) and Tinnitus (bilateral).  HPI  Patient presents today for bilateral ear cleaning.  She presented to audiology and was found to have complete bilateral ear canal blockage with cerumen.  Patient admits to having tinnitus that is constant and annoying.  No complaints of tinnitus dizziness or vertigo.  No prior history of ear surgery or trauma to the ears.  No family history of ear disease.  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            Audiogram findings shows normal hearing from 125 Hz to 500 Hz sloping to mild bilateral sensorineural hearing loss.  Normal tympanogram bilateral.  Word discrimination excellent bilaterally.  Acoustic reflexes present right and left ipsilateral.    Review of Systems    All other systems have been reviewed and are negative for complaints except for those mentioned in history of present illness, past medical history and problem list       Objective   Physical Exam    CONSTITUTIONAL: No acute distress, normal facial features; No fever; no chills  VOICE: No hoarseness or other audible abnormality  RESPIRATION: Breathing comfortably, no stridor; normal breathing effort  CV: No cyanosis visible on the face and neck area  EYES:Pupils equal and round ; no erythema; conjunctiva clear; sclera white  NEURO: Alert and oriented, able to raise eyebrows symmetrical bilateral, smile with no facial droop, able to swallow  HEAD AND FACE: Symmetric facial features, no masses or lesions    Right ear examination: External ear normal.  EAC with impacted cerumen.  TM not visualized.  Left ear examination: External ear normal.  EAC with impacted cerumen.  TM not visualized.    NOSE: External nose midline  ORAL CAVITY: No lesions of external lips; uvula is midline; tongue with good mobility; no gross mass in oral cavity; mucosa appears pink   NECK/LYMPH: No obvious  deformity or lesions; trachea is midline  PSYCH: Alert and oriented with appropriate mood and affect.    Patient ID: Buffy Temple is a 68 y.o. female.    Procedures    Cerumen removal    Consent:  The planned procedure is discussed including possible risk, benefits and alternative treatments reviewed.  Verbal consent is obtained.    Indications:Obstructed cerumen is noted affecting hearing and causing discomfort.    Procedure: The ears are examined microscopically.  Using speculum, alligator, #7, #5 suction the obstructive cerumen in both the ears were removed.    Findings: Cerumen and epithelial debris obstruction in both external auditory canals.  Inspection of tympanic membrane after cleaning showed intact with no effusion, retraction or perforation.    Post procedure: The patient tolerated the procedure well without complications       Assessment/Plan       1. Bilateral impacted cerumen        2. Tinnitus of both ears  Referral to ENT        Cerumen was successfully removed using microscope and instruments.  Patient tolerated procedure well.    DISCUSSION TINNITUS   The likely etiology of the tinnitus was reviewed. The various masking and distraction techniques were discussed including hearing aids, tinnitus retraining therapy, sounds to coverup tinnitus, biofeedback, cognitive behavioral therapy, acupuncture.  Patient is not a candidate for hearing aids at this time but encouraged to use ambient music background to drown out the annoying tinnitus.  She may follow-up in 1 year for repeat hearing test and ear cleaning.  All questions were answered to the patient's satisfaction.           PANCHITO Tena-CNP 10/03/24 9:05 AM

## 2024-10-14 ENCOUNTER — HOSPITAL ENCOUNTER (OUTPATIENT)
Dept: CARDIOLOGY | Facility: CLINIC | Age: 69
Discharge: HOME | End: 2024-10-14
Payer: MEDICARE

## 2024-10-14 DIAGNOSIS — Z95.810 CARDIAC DEFIBRILLATOR IN PLACE: ICD-10-CM

## 2024-10-14 DIAGNOSIS — I42.9 CARDIOMYOPATHY, UNSPECIFIED TYPE (MULTI): ICD-10-CM

## 2024-10-14 PROCEDURE — 93296 REM INTERROG EVL PM/IDS: CPT

## 2024-11-01 ENCOUNTER — ANCILLARY PROCEDURE (OUTPATIENT)
Dept: CARDIOLOGY | Facility: CLINIC | Age: 69
End: 2024-11-01
Payer: MEDICARE

## 2024-11-01 ENCOUNTER — OFFICE VISIT (OUTPATIENT)
Dept: CARDIOLOGY | Facility: CLINIC | Age: 69
End: 2024-11-01
Payer: MEDICARE

## 2024-11-01 VITALS
BODY MASS INDEX: 27.7 KG/M2 | HEIGHT: 69 IN | SYSTOLIC BLOOD PRESSURE: 143 MMHG | OXYGEN SATURATION: 99 % | DIASTOLIC BLOOD PRESSURE: 81 MMHG | WEIGHT: 187 LBS | HEART RATE: 60 BPM

## 2024-11-01 DIAGNOSIS — I48.11 LONGSTANDING PERSISTENT ATRIAL FIBRILLATION (MULTI): Primary | ICD-10-CM

## 2024-11-01 DIAGNOSIS — Z86.79 HISTORY OF ATRIAL FIBRILLATION: ICD-10-CM

## 2024-11-01 DIAGNOSIS — I10 PRIMARY HYPERTENSION: ICD-10-CM

## 2024-11-01 DIAGNOSIS — Z95.810 PRESENCE OF AUTOMATIC (IMPLANTABLE) CARDIAC DEFIBRILLATOR: ICD-10-CM

## 2024-11-01 DIAGNOSIS — I49.5 SICK SINUS SYNDROME (MULTI): ICD-10-CM

## 2024-11-01 DIAGNOSIS — F17.210 CIGARETTE NICOTINE DEPENDENCE, UNCOMPLICATED: ICD-10-CM

## 2024-11-01 DIAGNOSIS — I46.9 CARDIAC ARREST WITH PULSELESS ELECTRICAL ACTIVITY: ICD-10-CM

## 2024-11-01 DIAGNOSIS — I42.9 CARDIOMYOPATHY: ICD-10-CM

## 2024-11-01 DIAGNOSIS — I50.22 CHRONIC SYSTOLIC CONGESTIVE HEART FAILURE: ICD-10-CM

## 2024-11-01 PROCEDURE — 1159F MED LIST DOCD IN RCRD: CPT | Performed by: INTERNAL MEDICINE

## 2024-11-01 PROCEDURE — G2211 COMPLEX E/M VISIT ADD ON: HCPCS | Performed by: INTERNAL MEDICINE

## 2024-11-01 PROCEDURE — 3008F BODY MASS INDEX DOCD: CPT | Performed by: INTERNAL MEDICINE

## 2024-11-01 PROCEDURE — 1160F RVW MEDS BY RX/DR IN RCRD: CPT | Performed by: INTERNAL MEDICINE

## 2024-11-01 PROCEDURE — 93005 ELECTROCARDIOGRAM TRACING: CPT

## 2024-11-01 PROCEDURE — 93005 ELECTROCARDIOGRAM TRACING: CPT | Performed by: INTERNAL MEDICINE

## 2024-11-01 PROCEDURE — 3077F SYST BP >= 140 MM HG: CPT | Performed by: INTERNAL MEDICINE

## 2024-11-01 PROCEDURE — 1126F AMNT PAIN NOTED NONE PRSNT: CPT | Performed by: INTERNAL MEDICINE

## 2024-11-01 PROCEDURE — 99214 OFFICE O/P EST MOD 30 MIN: CPT | Performed by: INTERNAL MEDICINE

## 2024-11-01 PROCEDURE — 3079F DIAST BP 80-89 MM HG: CPT | Performed by: INTERNAL MEDICINE

## 2024-11-01 RX ORDER — FUROSEMIDE 20 MG/1
TABLET ORAL
Qty: 108 TABLET | Refills: 3 | Status: SHIPPED | OUTPATIENT
Start: 2024-11-01 | End: 2025-11-01

## 2024-11-01 RX ORDER — AMIODARONE HYDROCHLORIDE 200 MG/1
200 TABLET ORAL DAILY
Qty: 90 TABLET | Refills: 1 | Status: SHIPPED | OUTPATIENT
Start: 2024-11-01

## 2024-11-01 RX ORDER — METOPROLOL SUCCINATE 100 MG/1
100 TABLET, EXTENDED RELEASE ORAL EVERY 12 HOURS
Qty: 180 TABLET | Refills: 3 | Status: SHIPPED | OUTPATIENT
Start: 2024-11-01

## 2024-11-01 RX ORDER — ATORVASTATIN CALCIUM 40 MG/1
40 TABLET, FILM COATED ORAL DAILY
Qty: 90 TABLET | Refills: 3 | Status: SHIPPED | OUTPATIENT
Start: 2024-11-01 | End: 2025-11-01

## 2024-11-01 ASSESSMENT — PAIN SCALES - GENERAL: PAINLEVEL_OUTOF10: 0-NO PAIN

## 2024-11-13 LAB
ATRIAL RATE: 60 BPM
P AXIS: 29 DEGREES
P OFFSET: 195 MS
P ONSET: 136 MS
PR INTERVAL: 180 MS
Q ONSET: 226 MS
QRS COUNT: 10 BEATS
QRS DURATION: 94 MS
QT INTERVAL: 436 MS
QTC CALCULATION(BAZETT): 436 MS
QTC FREDERICIA: 436 MS
R AXIS: 38 DEGREES
T AXIS: 62 DEGREES
T OFFSET: 444 MS
VENTRICULAR RATE: 60 BPM

## 2025-01-13 ENCOUNTER — HOSPITAL ENCOUNTER (OUTPATIENT)
Dept: CARDIOLOGY | Facility: CLINIC | Age: 70
Discharge: HOME | End: 2025-01-13
Payer: MEDICARE

## 2025-01-13 DIAGNOSIS — Z95.810 PRESENCE OF AUTOMATIC (IMPLANTABLE) CARDIAC DEFIBRILLATOR: ICD-10-CM

## 2025-01-13 DIAGNOSIS — I49.5 SICK SINUS SYNDROME (MULTI): ICD-10-CM

## 2025-01-13 PROCEDURE — 93295 DEV INTERROG REMOTE 1/2/MLT: CPT | Performed by: INTERNAL MEDICINE

## 2025-01-13 PROCEDURE — 93296 REM INTERROG EVL PM/IDS: CPT

## 2025-04-15 ENCOUNTER — HOSPITAL ENCOUNTER (OUTPATIENT)
Dept: CARDIOLOGY | Facility: CLINIC | Age: 70
Discharge: HOME | End: 2025-04-15
Payer: MEDICARE

## 2025-04-15 DIAGNOSIS — Z95.810 PRESENCE OF AUTOMATIC (IMPLANTABLE) CARDIAC DEFIBRILLATOR: ICD-10-CM

## 2025-04-15 DIAGNOSIS — I49.5 SICK SINUS SYNDROME (MULTI): ICD-10-CM

## 2025-04-15 PROCEDURE — 93296 REM INTERROG EVL PM/IDS: CPT

## 2025-04-15 PROCEDURE — 93295 DEV INTERROG REMOTE 1/2/MLT: CPT | Performed by: INTERNAL MEDICINE

## 2025-05-02 ENCOUNTER — ANCILLARY PROCEDURE (OUTPATIENT)
Dept: CARDIOLOGY | Facility: CLINIC | Age: 70
End: 2025-05-02
Payer: MEDICARE

## 2025-05-02 DIAGNOSIS — I50.22 CHRONIC SYSTOLIC CONGESTIVE HEART FAILURE: ICD-10-CM

## 2025-05-02 DIAGNOSIS — I48.11 LONGSTANDING PERSISTENT ATRIAL FIBRILLATION (MULTI): ICD-10-CM

## 2025-05-02 DIAGNOSIS — I46.9 CARDIAC ARREST WITH PULSELESS ELECTRICAL ACTIVITY: ICD-10-CM

## 2025-05-02 PROCEDURE — 93306 TTE W/DOPPLER COMPLETE: CPT

## 2025-05-02 PROCEDURE — 93306 TTE W/DOPPLER COMPLETE: CPT | Performed by: INTERNAL MEDICINE

## 2025-05-05 LAB
AORTIC VALVE MEAN GRADIENT: 2 MMHG
AORTIC VALVE PEAK VELOCITY: 1.11 M/S
AV PEAK GRADIENT: 5 MMHG
AVA (PEAK VEL): 2.15 CM2
AVA (VTI): 1.9 CM2
EJECTION FRACTION APICAL 4 CHAMBER: 63.6
EJECTION FRACTION: 64 %
LEFT ATRIUM VOLUME AREA LENGTH INDEX BSA: 48.3 ML/M2
LEFT VENTRICLE INTERNAL DIMENSION DIASTOLE: 5.61 CM (ref 3.5–6)
LEFT VENTRICULAR OUTFLOW TRACT DIAMETER: 1.97 CM
MITRAL VALVE E/A RATIO: 48.74
RIGHT VENTRICLE FREE WALL PEAK S': 6 CM/S
RIGHT VENTRICLE PEAK SYSTOLIC PRESSURE: 26.1 MMHG

## 2025-05-15 PROBLEM — Z86.79 HISTORY OF ATRIAL FIBRILLATION: Status: RESOLVED | Noted: 2023-11-07 | Resolved: 2025-05-15

## 2025-05-15 PROBLEM — Z86.39 HISTORY OF ELEVATED LIPIDS: Status: RESOLVED | Noted: 2024-09-03 | Resolved: 2025-05-15

## 2025-05-16 ENCOUNTER — OFFICE VISIT (OUTPATIENT)
Dept: CARDIOLOGY | Facility: CLINIC | Age: 70
End: 2025-05-16
Payer: MEDICARE

## 2025-05-16 VITALS
WEIGHT: 189 LBS | BODY MASS INDEX: 27.99 KG/M2 | OXYGEN SATURATION: 99 % | DIASTOLIC BLOOD PRESSURE: 58 MMHG | SYSTOLIC BLOOD PRESSURE: 132 MMHG | HEART RATE: 61 BPM | RESPIRATION RATE: 18 BRPM | HEIGHT: 69 IN

## 2025-05-16 DIAGNOSIS — I49.5 SICK SINUS SYNDROME (MULTI): ICD-10-CM

## 2025-05-16 DIAGNOSIS — Z95.810 PRESENCE OF AUTOMATIC (IMPLANTABLE) CARDIAC DEFIBRILLATOR: ICD-10-CM

## 2025-05-16 DIAGNOSIS — I47.29 VENTRICULAR TACHYCARDIA, NON-SUSTAINED (MULTI): ICD-10-CM

## 2025-05-16 DIAGNOSIS — I10 PRIMARY HYPERTENSION: ICD-10-CM

## 2025-05-16 DIAGNOSIS — I50.22 CHRONIC SYSTOLIC CONGESTIVE HEART FAILURE: ICD-10-CM

## 2025-05-16 DIAGNOSIS — F17.210 CIGARETTE NICOTINE DEPENDENCE, UNCOMPLICATED: ICD-10-CM

## 2025-05-16 DIAGNOSIS — I48.3 TYPICAL ATRIAL FLUTTER (MULTI): ICD-10-CM

## 2025-05-16 DIAGNOSIS — I48.11 LONGSTANDING PERSISTENT ATRIAL FIBRILLATION (MULTI): Primary | ICD-10-CM

## 2025-05-16 DIAGNOSIS — I46.9 CARDIAC ARREST WITH PULSELESS ELECTRICAL ACTIVITY: ICD-10-CM

## 2025-05-16 DIAGNOSIS — E78.49 OTHER HYPERLIPIDEMIA: ICD-10-CM

## 2025-05-16 LAB
ATRIAL RATE: 61 BPM
P AXIS: 77 DEGREES
P OFFSET: 188 MS
P ONSET: 119 MS
PR INTERVAL: 210 MS
Q ONSET: 224 MS
QRS COUNT: 9 BEATS
QRS DURATION: 92 MS
QT INTERVAL: 416 MS
QTC CALCULATION(BAZETT): 418 MS
QTC FREDERICIA: 418 MS
R AXIS: 93 DEGREES
T AXIS: 67 DEGREES
T OFFSET: 432 MS
VENTRICULAR RATE: 61 BPM

## 2025-05-16 PROCEDURE — 1159F MED LIST DOCD IN RCRD: CPT | Performed by: INTERNAL MEDICINE

## 2025-05-16 PROCEDURE — 3008F BODY MASS INDEX DOCD: CPT | Performed by: INTERNAL MEDICINE

## 2025-05-16 PROCEDURE — 3078F DIAST BP <80 MM HG: CPT | Performed by: INTERNAL MEDICINE

## 2025-05-16 PROCEDURE — 3075F SYST BP GE 130 - 139MM HG: CPT | Performed by: INTERNAL MEDICINE

## 2025-05-16 PROCEDURE — 99215 OFFICE O/P EST HI 40 MIN: CPT | Performed by: INTERNAL MEDICINE

## 2025-05-16 PROCEDURE — G2211 COMPLEX E/M VISIT ADD ON: HCPCS | Performed by: INTERNAL MEDICINE

## 2025-05-16 PROCEDURE — 93005 ELECTROCARDIOGRAM TRACING: CPT | Performed by: INTERNAL MEDICINE

## 2025-05-16 ASSESSMENT — ENCOUNTER SYMPTOMS: DEPRESSION: 0

## 2025-05-16 NOTE — ASSESSMENT & PLAN NOTE
Maintained on amiodarone 200 mg daily for arrhythmia control in the setting of atrial fibrillation with RVR and sick sinus syndrome with significant pauses status post AICD/permanent pacemaker implantation  -- Sacubitril-valsartan 24/26 twice daily  -- Metoprolol succinate 100 mg twice daily  -- Repeated Echocardiogram 05/2025 showed recovery of LVEF again.

## 2025-05-16 NOTE — ASSESSMENT & PLAN NOTE
Please stop smoking.    --Try to cut back on the number of cigarettes that you smoke.    -- Try to increase the interval between cigarettes.   -- Try to use substitutes such as sugar-free candy carrots,celery sticks as in between.  --  Once you are down to less than half a pack a day try to go cold turkey.   -- Try to designate areas in the your home as smoke-free areas.   -- Try to reduce the number of ashtrays and other reminders of smoking.   -- Try to keep any major something that you dislike next to your cigarette pack to try to develop a mental aversion to smoking

## 2025-05-16 NOTE — PROGRESS NOTES
Chief Complaint:   Follow-up     History Of Present Illness:    Buffy Temple is a 69 y.o. female presenting with  pertinent cardiac history of permanent atrial fibrillation, chronic systolic heart failure, chronic nonischemic cardiomyopathy, who is here to follow-up with cardiology for continued management of Permanent Atrial Fibrillation , Chronic Systolic Heart Failure     Pertinent Cardiology Hx   Hx of PEA Cardiac Arrest 1/31/2020 -- Witnessed event in ER ; Occurring in the setting of possible septic shock / Atrial Fibrillation with RVR     She was on Sotalol 40 mg BID and Metoprolol Succinate 100 mg home dose. She had apparently received 100 mg PO Metoprolol Succinate for a reported missed dose +and then was placed on Diltiazem 20 mg Push + GTT for Atrial Fibrillation with RVR and was receiving Azithromycin / Ceftriaxone at the time of her event for presumed Community Acquired Bacterial Pneumonia. She was also exceptionally acidotic with ABG of 7.05/54/59 and elevated Lactic Acid of 3.6. She was admitted to ICU, Diltiazem Gtt Discontinued. Sotalol was held due to renal issues as well as the circumstances of her PEA arrest. Medications were adjusted to reflect her drop in her LVEF. At time of discharge, she was to have been referred back to Dr Ashraf for management of her Persistent Atrial Fibrillation.     Medications as reconciled at time of discharge were   -- Aldactone 25 mg oral tablet - 1 tab(s) orally 2 times a day ( Stopped )   -- Losartan 25 mg oral tablet - 1 tab(s) orally once a day  -- isosorbide dinitrate 10 mg oral tablet - 1 tab(s) orally 3 times a day ( Stopped )   -- Metoprolol Tartrate 25 mg oral tablet - 1 tab(s) orally every 12 hours  -- Lasix 40 mg oral tablet - 1 tab(s) orally once a day ( Stopped)   -- Digoxin 125 mcg (0.125 mg) oral tablet - 1 tab(s) orally once a day ( Stopped )  -- Rivaroxaban 20 mg oral tablet - 1 tab(s) orally once a day (in the evening)     Persistent Atrial  Fibrillation -- CHADS-VASC = 6  First noted In 2013. She presented with atrial fibrillation and cardiomyopathy secondary to tachycardia. She failed cardioversion. She was put on amiodarone and converted to sinus rhythm and subsequently underwent PVAI in January 2014. She had one episode of paroxsymal A. fib in March 2014. Subsequently on telemetry. Remote monitoring showed normal sinus rhythm. Her oral anticoagulation was discontinued. In 2015 she had a stroke involving the distal  left MCA territory. She was re-started on oral anticoagulation and underwent re-do PVAI following failed Dofetilide loading in September 2017. She maintained NSR until 7/2019 at which time she was started on Sotalol and then underwent DCCV 8/2019 with restoration of Sinus Rhythm. This was apparently maintained until January 2020 when she had her PEA arrest. Her 14 day event monitor in March 2020 revealed about 47% atrial fibrillation burden of which she is asymptomatic.      She is seen today for routine follow-up. Overall, she feels that she is doing very well. She notes that she has still has atrial fibrillation, but she notes that she does not feel it. She is still smoking, but is down to a quarter to half pack daily. Price is becoming an issue, which is prompting her to consider abstinence. She does have a prescription for Chantix but has not started it. She was recently started on simvastatin 20 by her PCP. She is tolerating at this time.     ( 11/7/2022) She returns for scheduled followup. She has been able to secure laboratory studies. However, she has been compliant with all medications. She has been able to do all of her daily activities without undue compromise. She denies any chest pressure, pain, palpitations, shortness of breath. We discussed her EKG, she is somewhat surprised if he that she is back in atrial fibrillation     Today ( 11/18/2022) she returns for follow-up of atrial fibrillation. She has tolerated increased  doses of beta-blockers. She has not noticed any lightheadedness, dizziness. She feels that many of her symptoms have improved. She remains surprised that she is still in atrial fibrillation. We discussed cardioversion. At this time, she does not wish to proceed with this nor does she wish to try any new medications.     Today ( 2/28/2023) She returns for follow up. SHe had some confusion about follow up. She has been trying to lose weight by changing her diet, but notes that she drinks a lot of creamer in her coffee and she contines to smoke when she is drinking her coffee. Denies Bleeding, bruising complications.      6/12/2023  -- She returns for follow up of echocardiogram and testing. She reports that she has continued to be in her usual state of health. She denies any increased heart palpitations, shortness of breath. She does however continue to smoke, currently heavily of cigarette smoke. She reports that she has been compliant with medications.        9/11/2023  -- She returns for follow up. She had difficulty with her cardioversion scheduling. Further, she was very confused about medications. She apparently stopped taking metoprolol and instead continued on losartan with the addition of Entresto. She continues to smoke, at least a pack per day. She denies any other complaints.       11/30/2023 -- She returns for follow up. Since she was last seen she was admitted to [facility] where she was noted to have Tachy-nikki and had 12 second post conversion pauses,    2/29/2024 -- She presents for follow up. She reports that she has overall been well.  She wonders whether or not she truly needed to have her permanent pacemaker and we again reviewed what was occurring up to and including 12-second pauses after her atrial fibrillation converted to sinus rhythm.  Otherwise, she offers no complaints.  She continues to try to quit her smoking.  She feels better.     7/1/2024 -- She returns for follow up. She has  met with Electrophysiology and has agreed to move forward with ablation. She is tentatively scheduled for later this kye. She has been struggling with headaches and has been reducing her metoprolol to 1/2 tablet every day ( 200 mg Total Daily dose --> 50 mg total daily ) . She notes improvement in her headaches at a lower dose.  She confirms that she is not missed any doses of her anticoagulation.  We reviewed the importance of this especially leading up to her ablation.  She understands.    11/1/2024 -- She returns for follow up. She has overall been well since she was last seen.  She underwent successful ablation in July 2024.  She had minor bleeding and bruising at the her access points following her ablation.  She has remained compliant on all medications.  She has not had any lightheadedness, dizziness.  To the best of her knowledge, she has not had any ICD discharges.  She continues to smoke, down to 1/2 pack.  She otherwise offers no complaints    5/16/2025 --> She returns for follow up. She continues to overall be well. Her device was interrogated in April 2025 and showed 86% Capacity. She continues to have episodes of atrial fibrillation and non sustained Ventricular tachycardia. I do not see any discharges noted on her reports however.     Patient denies chest pain and angina. Pt denies shortness of breath, dyspnea on exertion, orthopnea, and paroxysmal nocturnal dyspnea. Pt denies worsening lower extremity edema. Pt denies palpitations or syncope. No recent falls. No fever or chills. No cough. No change in bowel or bladder habits. No travel. No sick contacts. No recent travel     12 point review of systems was performed and is otherwise negative.  Past medical history: As above.  Medications: Reviewed.  Allergies: Reviewed.  Social history: Patient denies alcohol abuse, or illicit drug use. She unfortunately still smokes. She has not tried Chantix.  Family history: No sudden cardiac death or premature  "coronary artery disease.   .     Last Recorded Vitals:  Vitals:    05/16/25 0858   BP: 155/82   BP Location: Right arm   Patient Position: Sitting   BP Cuff Size: Adult   Pulse: 61   Resp: 18   SpO2: 99%   Weight: 85.7 kg (189 lb)   Height: 1.753 m (5' 9\")               Past Medical History:  She has a past medical history of Essential (primary) hypertension (11/18/2022), Other conditions influencing health status (08/18/2013), Personal history of other endocrine, nutritional and metabolic disease, and Personal history of other specified conditions.    Past Surgical History:  She has a past surgical history that includes Other surgical history (08/23/2013); MR angio head wo IV contrast (9/13/2015); MR angio neck w IV contrast (9/13/2015); Cardiac electrophysiology procedure (Left, 11/10/2023); Cardiac electrophysiology procedure (N/A, 7/15/2024); and Cardiac electrophysiology procedure (Left, 11/10/2023).      Social History:  She reports that she has been smoking cigarettes. She has never used smokeless tobacco. She reports that she does not currently use alcohol. She reports that she does not currently use drugs.    Family History:  No family history on file.     Allergies:  Patient has no known allergies.    Outpatient Medications:  Current Outpatient Medications   Medication Instructions    amiodarone (PACERONE) 200 mg, oral, Daily    atorvastatin (LIPITOR) 40 mg, oral, Daily    cholecalciferol (Vitamin D-3) 50 mcg (2,000 unit) capsule 1 capsule, Daily    furosemide (Lasix) 20 mg tablet Take 1 tablet (20 mg) by mouth once a day on Sunday, Monday, Wednesday, Friday, and Saturday AND 2 tablets (40 mg) once a day on Tuesday and Thursday.    metoprolol succinate XL (TOPROL-XL) 100 mg, oral, Every 12 hours    naproxen sodium (ALEVE) 220 mg, As needed    pantoprazole (PROTONIX) 40 mg, oral, Daily, Do not crush, chew, or split. Take for 30 days post procedure then stop    rivaroxaban (XARELTO) 20 mg, oral, Daily, Take " "with food.    sacubitriL-valsartan (Entresto) 24-26 mg tablet 1 tablet, oral, 2 times daily       Physical Exam:  /82 (BP Location: Right arm, Patient Position: Sitting, BP Cuff Size: Adult)   Pulse 61   Resp 18   Ht 1.753 m (5' 9\")   Wt 85.7 kg (189 lb)   SpO2 99%   BMI 27.91 kg/m²   General:  Patient is awake, alert, and oriented.  Patient is in no acute distress.  Smells of smoke  HEENT:  Pupils equal and reactive.  Normocephalic.  Moist mucosa.    Neck:  No thyromegaly.  Normal Jugular Venous Pressure.  Cardiovascular: Regular Rate with Regular Rhythm  S1, S2 and a grade 1/6 systolic ejection murmur along the left ventricular outflow tract  Pulmonary:  Clear to auscultation bilaterally.  Abdomen:  Soft. Non-tender.   Non-distended.  Positive bowel sounds.  Lower Extremities:  2+ pedal pulses. No LE edema.  Neurologic:  Cranial nerves intact.  No focal deficit.   Skin: Skin warm and dry, normal skin turgor.   Psychiatric: Normal affect.         Last Labs:  CBC -  Lab Results   Component Value Date    WBC 5.1 07/10/2024    HGB 12.6 07/10/2024    HCT 40.2 07/10/2024     (H) 07/10/2024    PLT 93 (L) 07/10/2024       CMP -  Lab Results   Component Value Date    CALCIUM 8.8 07/10/2024    PHOS 2.9 11/07/2022    PROT 5.9 (L) 12/18/2023    ALBUMIN 3.9 12/18/2023    AST 12 12/18/2023    ALT 7 12/18/2023    ALKPHOS 118 12/18/2023    BILITOT 0.4 12/18/2023       LIPID PANEL -   Lab Results   Component Value Date    CHOL 170 06/02/2023    TRIG 64 06/02/2023    HDL 38.8 (A) 06/02/2023    CHHDL 4.4 06/02/2023    LDLF 123 (H) 01/04/2022    VLDL 19 01/04/2022       RENAL FUNCTION PANEL -   Lab Results   Component Value Date    GLUCOSE 89 07/10/2024     07/10/2024    K 5.2 07/10/2024     (H) 07/10/2024    CO2 26 07/10/2024    ANIONGAP 10 07/10/2024    BUN 35 (H) 07/10/2024    CREATININE 2.01 (H) 07/10/2024    CALCIUM 8.8 07/10/2024    PHOS 2.9 11/07/2022    ALBUMIN 3.9 12/18/2023        Lab Results "   Component Value Date    BNP 50 06/02/2023    HGBA1C 6.1 (H) 11/06/2023       Last Cardiology Tests:  ECG:      IN Office EKG 11/1/2024 -- Sinus        In Office EKG 7/1/20024 atrial fibrillation with RVR rates 105  IN Office EKG 2/29/2024 -- Sinus Rhythm, Septal Infarction pattern     In office EKG 9/11/2023  -- Rapid atrial flutter at 2-1 conduction     In office EKG 6/12/2023  -- Rate controlled atrial flutter  In office EKG 02/28/2022 3  --- Atrial fibrillation/flutter with controlled ventricular rates     In office EKG 11/18/2022  -- Atrial fibrillation with controlled rates ` 90      In office EKG 11/7/2022   -- Atrial fibrillation with RVR, Rates ~ 118     Event Monitor 11/2021  -- [employer]   --Predominant rhythm sinus bradycardia to sinus tachycardia with occurrences of atrial fibrillation with RVR  --Minimum heart rate 52 beats, maximum heart rate 172 beats, average heart rate 80 beats  --Single episode nonsustained ventricular tachycardia of 9 beats  --15,679 paroxysmal supraventricular contractions with 1% burden 144 occurrences of SVT, longest episode 11 beats  --Atrial fibrillation burden 52.81%, longest sustained episode 2 days 21 hours.    IN Office EKG 11/17/2021  -- Atrial FIbrillation with Controlled VR    Last Event Monitor 03/2020   47% Atrial Fibrillation [place]   Avg Rates ~ 75-89  Atrial Fibrillation Ranges ~ 104-173  Sinus Rhythm Rnages ~ 71-86     Interoffice EKG   -- 07/2020  -- Clarissa Sinus Rhythm  -- Normal Intervals ( Qtc 420 )   -- TWI in ( V4-V6)      Echo:  Echocardiogram 05/2025   1. Left ventricular ejection fraction is normal, calculated by Matthews's biplane at 64%.   2. There is reduced right ventricular systolic function.   3. The left atrial size is moderate to severely dilated.   4. Right ventricular systolic pressure is within normal limits.   5. The patient is in atrial fibrillation which may influence the estimate of left ventricular function and transvalvular  flows.    Echocardiogram 06/2023  1. Left ventricular systolic function is mildly to moderately decreased with a 35-40% estimated ejection fraction.  2. There is global hypokinesis of the left ventricle with minor regional variations.  3. Spectral Doppler shows an impaired relaxation pattern of left ventricular diastolic filling.  4. Increased LV mass.  5. There is moderate concentric left ventricular hypertrophy.  6. The left ventricular posterior wall thickness is moderately increased.  7. There is mildly reduced right ventricular systolic function.  8. The left atrium is moderate to severely dilated.  9. Moderate mitral valve regurgitation.  10. Compared with study from 12/9/2021, there is a significant reduction in LVEF from 55-60% to 35-40%.    Echocardiogram 12/2021  1. The left ventricular systolic function is normal with a 55-60% estimated ejection fraction.  2. The left atrium is severely dilated.  3. Interval improvement in Mitral Regurgitation Volume from 17.60 ml to 15.91 ml.  4. RVSP within normal limits    Echocardiogram 07/2020  1. The left ventricular systolic function is low normal with a 50-55% estimated ejection fraction.  2. There is mild to moderate eccentric left ventricular hypertrophy.  3. The left atrium is severely dilated.  4. Mild to moderate mitral valve regurgitation.  5. The patient is in atrial fibrillation which may influence the estimate of left ventricular function and transvalvular flows.     Echocardiogram 01/2020 ( following PEA Arrest / Septic Shock )   1. The left ventricular systolic function is severely decreased with a 15-20% estimated ejection fraction.  2. There is global hypokinesis of the left ventricle with minor regional variations.  3. The patient is in atrial fibrillation which may influence the estimate of left ventricular function and transvalvular flows.  4. The left ventricular cavity size is moderately dilated.  5. There is mild to moderate eccentric left  ventricular hypertrophy. Increased LV mass.  6. The left atrium is severely dilated.  7. Moderate mitral valve regurgitation.  8. There is mild to moderate tricuspid regurgitation.  9. There is a small pericardial effusion. There is no evidence of cardiac         Lab review: I have personally reviewed the laboratory result(s)   Diagnostic review: I have personally reviewed the result(s) of the EKG, Echocardiogram, and Holter Monitor .   Imaging review: I have  personally reviewed the result(s) Device interrogations     Assessment/Plan   Problem List Items Addressed This Visit       Atrial fibrillation (Multi) - Primary    Overview   Long Standing Persistent Atrial Fibrillation   [id number] Stroke Risk Points: 7   Values used to calculate this score:    Points  Metrics       1        Has Congestive Heart Failure: Yes       1        Has Hypertension: Yes       1        Age: 69       0        Has Diabetes: No       2        Had Stroke: No                 Had TIA: Yes                 Had Thromboembolism: No       1        Has Vascular Disease: Yes       1        Clinically Relevant Sex: Female    First noted In 2013. She presented with atrial fibrillation and cardiomyopathy secondary to tachycardia. She failed cardioversion. She was put on amiodarone and converted to sinus rhythm and subsequently underwent PVAI in January 2014. She had one episode of paroxsymal A. fib in March 2014. Subsequently on telemetry. Remote monitoring showed normal sinus rhythm. Her oral anticoagulation was discontinued. In 2015 she had a stroke involving the distal  left MCA territory. She was re-started on oral anticoagulation and underwent re-do PVAI following failed Dofetilide loading in September 2017. She maintained NSR until 7/2019 at which time she was started on Sotalol and then underwent DCCV 8/2019 with restoration of Sinus Rhythm. This was apparently maintained until January 2020 when she had her PEA arrest. Her 14 day event  monitor in March 2020 revealed about 47% atrial fibrillation burden of which she is asymptomatic.         Current Assessment & Plan   Loaded on Amiodarone for Cardioversion   -- Had 12 Second Post Conversion Pauses   -- AICD implanted 11/10/2023   -- [employer]ip Mejia DR [id number]  -- 2/29/2024, she returns for follow-up with sinus rhythm  -- Continue current medications including amiodarone 200 mg, metoprolol succinate 100 mg twice daily rivaroxaban 200 mg  -- Despite appropriate therapy, she has had continued breakthrough on amiodarone  -- Referred to electrophysiology for consideration of ablation therapy and underwent Ablation 7/2024  -- She has reduced metoprolol from 200 mg total daily dose down to 50 mg total daily dose.  I have asked her to try and increase back to at least 100 mg total daily dose for better rate control which she is in agreement to attempting.           Atrial flutter (Multi)    Cardiac arrest with pulseless electrical activity    Overview   Hx of PEA Cardiac Arrest 1/31/2020 -- Witnessed event in ER ; Occurring in the setting of possible septic shock / Atrial Fibrillation with RVR     She was on Sotalol 40 mg BID and Metoprolol Succinate 100 mg home dose. She had apparently received 100 mg PO Metoprolol Succinate for a reported missed dose +and then was placed on Diltiazem 20 mg Push + GTT for Atrial Fibrillation with RVR and was receiving Azithromycin / Ceftriaxone at the time of her event for presumed Community Acquired Bacterial Pneumonia. She was also exceptionally acidotic with ABG of 7.05/54/59 and elevated Lactic Acid of 3.6. She was admitted to ICU, Diltiazem Gtt Discontinued. Sotalol was held due to renal issues as well as the circumstances of her PEA arrest. Medications were adjusted to reflect her drop in her LVEF. At time of discharge, she was to have been referred back to Dr Ashraf for management of her Persistent Atrial Fibrillation.         Chronic systolic congestive heart  "failure    Overview   Chronic systolic heart failure likely related to atrial fibrillation  --Last Echocardiogram 06/2023 showed a depressed ejection fraction 40%.  It was felt that this was related to tachycardia induced cardiomyopathy in the setting of atrial fibrillation         Current Assessment & Plan   Maintained on amiodarone 200 mg daily for arrhythmia control in the setting of atrial fibrillation with RVR and sick sinus syndrome with significant pauses status post AICD/permanent pacemaker implantation  -- Sacubitril-valsartan 24/26 twice daily  -- Metoprolol succinate 100 mg twice daily  -- We will obtain a transthoracic echocardiogram for structural evaluation including ejection fraction, assessment of regional wall motion abnormalities or valvular disease, and further evaluation of hemodynamics now that she is maintained sinus for 6 months to reassess for recovery.  If not, then we will need to proceed with ischemic evaluation         Cigarette nicotine dependence, uncomplicated    Overview   5/16/2025 --> She continues to smoke cigarettes  \"Im doing better\"          Current Assessment & Plan   Please stop smoking.    --Try to cut back on the number of cigarettes that you smoke.    -- Try to increase the interval between cigarettes.   -- Try to use substitutes such as sugar-free candy carrots,celery sticks as in between.  --  Once you are down to less than half a pack a day try to go cold turkey.   -- Try to designate areas in the your home as smoke-free areas.   -- Try to reduce the number of ashtrays and other reminders of smoking.   -- Try to keep any major something that you dislike next to your cigarette pack to try to develop a mental aversion to smoking         HLD (hyperlipidemia)    Overview   The ASCVD Risk score (Chu NOGUERA, et al., 2019) failed to calculate for the following reasons:    Risk score cannot be calculated because patient has a medical history suggesting prior/existing ASCVD  Lab " "Results   Component Value Date    CHOL 170 06/02/2023    CHOL 190 11/07/2022    CHOL 178 01/04/2022     Lab Results   Component Value Date    HDL 38.8 (A) 06/02/2023    HDL 41.1 11/07/2022    HDL 36.6 (A) 01/04/2022     No results found for: \"LDLCALC\"  Lab Results   Component Value Date    TRIG 64 06/02/2023    TRIG 90 11/07/2022    TRIG 93 01/04/2022     No components found for: \"CHOLHDL\"             Hypertension    Overview   5/15/2025 There were no vitals taken for this visit.           Presence of automatic (implantable) cardiac defibrillator    Overview   Device -  -- St Rip Mejia DR [id number]         Sick sinus syndrome (Multi)    Ventricular tachycardia, non-sustained (Multi)    Overview   Noted on her Device Interrogation   -- She has not received device therapy for this          Current Assessment & Plan   Remains on Amiodarone at this time for suppression          Follow-up 6 months         Jose J Sands DO   Division of Cardiovascular Medicine  [facility] [place]        "

## 2025-05-16 NOTE — ASSESSMENT & PLAN NOTE
Loaded on Amiodarone for Cardioversion   -- Had 12 Second Post Conversion Pauses   -- AICD implanted 11/10/2023   -- St Rip Mejia DR [id number]  -- 2/29/2024, she returns for follow-up with sinus rhythm  -- Continue current medications including amiodarone 200 mg, metoprolol succinate 100 mg twice daily rivaroxaban 200 mg  -- Despite appropriate therapy, she has had continued breakthrough on amiodarone  -- Referred to electrophysiology for consideration of ablation therapy and underwent Ablation 7/2024  -- She has reduced metoprolol from 200 mg total daily dose down to 50 mg total daily dose.  I have asked her to try and increase back to at least 100 mg total daily dose for better rate control which she is in agreement to attempting.

## 2025-05-29 ENCOUNTER — APPOINTMENT (OUTPATIENT)
Dept: PRIMARY CARE | Facility: CLINIC | Age: 70
End: 2025-05-29
Payer: MEDICARE

## 2025-06-30 ENCOUNTER — APPOINTMENT (OUTPATIENT)
Dept: PRIMARY CARE | Facility: CLINIC | Age: 70
End: 2025-06-30
Payer: MEDICARE

## 2025-06-30 VITALS
BODY MASS INDEX: 27.64 KG/M2 | SYSTOLIC BLOOD PRESSURE: 130 MMHG | HEART RATE: 72 BPM | WEIGHT: 187.2 LBS | DIASTOLIC BLOOD PRESSURE: 80 MMHG | RESPIRATION RATE: 16 BRPM | TEMPERATURE: 97.8 F

## 2025-06-30 DIAGNOSIS — J44.9 CHRONIC OBSTRUCTIVE PULMONARY DISEASE, UNSPECIFIED COPD TYPE (MULTI): ICD-10-CM

## 2025-06-30 DIAGNOSIS — I48.11 LONGSTANDING PERSISTENT ATRIAL FIBRILLATION (MULTI): Primary | ICD-10-CM

## 2025-06-30 DIAGNOSIS — I10 PRIMARY HYPERTENSION: ICD-10-CM

## 2025-06-30 DIAGNOSIS — N18.4 CKD (CHRONIC KIDNEY DISEASE) STAGE 4, GFR 15-29 ML/MIN (MULTI): ICD-10-CM

## 2025-06-30 DIAGNOSIS — Z11.59 NEED FOR HEPATITIS C SCREENING TEST: ICD-10-CM

## 2025-06-30 DIAGNOSIS — E78.49 OTHER HYPERLIPIDEMIA: ICD-10-CM

## 2025-06-30 DIAGNOSIS — Z13.1 SCREENING FOR DIABETES MELLITUS (DM): ICD-10-CM

## 2025-06-30 DIAGNOSIS — Z12.31 SCREENING MAMMOGRAM, ENCOUNTER FOR: ICD-10-CM

## 2025-06-30 DIAGNOSIS — F17.210 TOBACCO DEPENDENCE DUE TO CIGARETTES: ICD-10-CM

## 2025-06-30 DIAGNOSIS — J30.9 ALLERGIC SINUSITIS: ICD-10-CM

## 2025-06-30 DIAGNOSIS — D36.9 TUBULAR ADENOMA: ICD-10-CM

## 2025-06-30 DIAGNOSIS — F17.200 TOBACCO DEPENDENCE: ICD-10-CM

## 2025-06-30 PROCEDURE — 99497 ADVNCD CARE PLAN 30 MIN: CPT | Performed by: INTERNAL MEDICINE

## 2025-06-30 PROCEDURE — G0439 PPPS, SUBSEQ VISIT: HCPCS | Performed by: INTERNAL MEDICINE

## 2025-06-30 PROCEDURE — 4004F PT TOBACCO SCREEN RCVD TLK: CPT | Performed by: INTERNAL MEDICINE

## 2025-06-30 PROCEDURE — 99406 BEHAV CHNG SMOKING 3-10 MIN: CPT | Performed by: INTERNAL MEDICINE

## 2025-06-30 PROCEDURE — 1159F MED LIST DOCD IN RCRD: CPT | Performed by: INTERNAL MEDICINE

## 2025-06-30 PROCEDURE — 1160F RVW MEDS BY RX/DR IN RCRD: CPT | Performed by: INTERNAL MEDICINE

## 2025-06-30 PROCEDURE — 3079F DIAST BP 80-89 MM HG: CPT | Performed by: INTERNAL MEDICINE

## 2025-06-30 PROCEDURE — 3075F SYST BP GE 130 - 139MM HG: CPT | Performed by: INTERNAL MEDICINE

## 2025-06-30 PROCEDURE — 99214 OFFICE O/P EST MOD 30 MIN: CPT | Performed by: INTERNAL MEDICINE

## 2025-06-30 PROCEDURE — 1124F ACP DISCUSS-NO DSCNMKR DOCD: CPT | Performed by: INTERNAL MEDICINE

## 2025-06-30 RX ORDER — FLUTICASONE PROPIONATE 50 MCG
2 SPRAY, SUSPENSION (ML) NASAL DAILY
Qty: 16 G | Refills: 5 | Status: SHIPPED | OUTPATIENT
Start: 2025-06-30 | End: 2026-06-30

## 2025-06-30 ASSESSMENT — PATIENT HEALTH QUESTIONNAIRE - PHQ9
SUM OF ALL RESPONSES TO PHQ9 QUESTIONS 1 AND 2: 0
1. LITTLE INTEREST OR PLEASURE IN DOING THINGS: NOT AT ALL
SUM OF ALL RESPONSES TO PHQ9 QUESTIONS 1 AND 2: 0
1. LITTLE INTEREST OR PLEASURE IN DOING THINGS: NOT AT ALL
2. FEELING DOWN, DEPRESSED OR HOPELESS: NOT AT ALL
2. FEELING DOWN, DEPRESSED OR HOPELESS: NOT AT ALL

## 2025-06-30 ASSESSMENT — COLUMBIA-SUICIDE SEVERITY RATING SCALE - C-SSRS
2. HAVE YOU ACTUALLY HAD ANY THOUGHTS OF KILLING YOURSELF?: NO
1. IN THE PAST MONTH, HAVE YOU WISHED YOU WERE DEAD OR WISHED YOU COULD GO TO SLEEP AND NOT WAKE UP?: NO
6. HAVE YOU EVER DONE ANYTHING, STARTED TO DO ANYTHING, OR PREPARED TO DO ANYTHING TO END YOUR LIFE?: NO

## 2025-06-30 ASSESSMENT — ENCOUNTER SYMPTOMS
LOSS OF SENSATION IN FEET: 0
OCCASIONAL FEELINGS OF UNSTEADINESS: 0
DEPRESSION: 0

## 2025-06-30 NOTE — PROGRESS NOTES
"Buffy Temple is a 69 y.o. female here for a Medicare Wellness Exam.    Chief Complaint   Patient presents with    Annual Exam        Patient with a past medical history of HTN, HLD, CKD IV, MNG, Pancytopenia, COPD, permanent atrial fibrillation, embolic CVA, chronic systolic heart failure, chronic nonischemic cardiomyopathy, sick sinus syndrome s/p pacemaker/AICD DJD, Thrombocytopenia, goiter, Osteopenia (DEXA 2026), mammogram in July, Tubular Adenoma (due 2025)     Has been sneezing/ coughing x 3 months  Blowing nose  No SOB        No chest pain/  SOB/ dizziness  BM OK  Energy level ok  Appetite OK    Still smoking             Medicare Wellness Exam    The patent is being seen for a follow up annual wellness visit  Past Medical, Surgical and family History: Reviewed and updated in chart  Interval History: Patient has not been hospitalized previously  Medications and Supplements: Review of all medications by a prescribing practitioner or clinical pharmacist (such as prescriptions, OTC, Herbal therapies and supplements) documented in the medical record.    Patient Self-Assessment of health: Good  Tobacco Use: Yes  Alcohol Use: No  Illicit drug use: No  Patient using opioids: No    Current Diet: in general, a \"healthy\" diet    Adequate fluid intake: Yes  Caffeine intake: Yes  Exercise frequency: moderately active    Depression/Suicide screening: PHQ2/ PHQ9 (see screenings tab)    Hearing impairment: Yes  Uses hearing aids N/A  Cognitive impairment Observation: No   Patient or family reported cognitive impairment: Yes    Bathing: independent  Dressing: independent  Walking: independent  Taking Medications: independent  Feeding: independent  Personal Hygiene: independent  Managing Finances: independent  Shopping: independent  Housework/Basic Home Maintenance: independent  Handling transportation: independent  Preparing meals: independent    Bowels: continent  Bladder: continent    Falls Risk: has notfallen in " last 6 months.   Their fall has not resulted in an injury.   Fall risk Factors: Fall Risk Factors: Antihypertensive Use none   Care Plan Risk: Care Plan: Low/Moderate Risk: Regular physical activity such as walking, water aerobics or angelo chi to improve strength, balance, coordination and flexibility. Wear appropriate, sensible shoe wear. Remove fall hazards at home such as loose rugs, obstacles, use non-slip surface in bath or shower. Keep living space well lit.      Home Safety Risk Factors: Home Safety Risk Factors: No Bathroom Grab Bars  Advanced Directives:  Living will: No POA: No    Patient's End of Life Decisions: Provider agree to follow.      Medical History[1]     Current Outpatient Medications   Medication Instructions    amiodarone (PACERONE) 200 mg, oral, Daily    atorvastatin (LIPITOR) 40 mg, oral, Daily    cholecalciferol (Vitamin D-3) 50 mcg (2,000 unit) capsule 1 capsule, Daily    furosemide (Lasix) 20 mg tablet Take 1 tablet (20 mg) by mouth once a day on Sunday, Monday, Wednesday, Friday, and Saturday AND 2 tablets (40 mg) once a day on Tuesday and Thursday.    metoprolol succinate XL (TOPROL-XL) 100 mg, oral, Every 12 hours    naproxen sodium (ALEVE) 220 mg, As needed    pantoprazole (PROTONIX) 40 mg, oral, Daily, Do not crush, chew, or split. Take for 30 days post procedure then stop    rivaroxaban (XARELTO) 20 mg, oral, Daily, Take with food.    sacubitriL-valsartan (Entresto) 24-26 mg tablet 1 tablet, oral, 2 times daily       Review of Systems     Constitutional: no fever, no chills, not feeling poorly, not feeling tired and no recent weight gain, no recent weight loss.   ENT: no earache, no hearing loss, no nosebleeds, no nasal discharge, no sore throat and no hoarseness.   Cardiovascular: the heart rate was not slow, the heart rate was not fast, no chest pain, no palpitations, no intermittent leg claudication and no lower extremity edema.   Respiratory: no cough, wheezing or shortness of  breath at rest or exertion  Gastrointestinal: no abdominal pain, no constipation, no melena, no nausea, no diarrhea, no vomiting and no blood in stools.   Musculoskeletal: no arthralgias, no myalgias, no back pain, no joint swelling, no joint stiffness, no limb pain and no limb swelling.   Integumentary: no skin rashes, no skin lesions, no itching, no skin wound and no dry skin.   Neurological: no headache, no confusion, no numbness, no dizziness, no tingling and no fainting.   All other systems have been reviewed and are negative for complaint.        Physical Exam    Constitutional   General appearance: Alert and in no acute distress.     Pulmonary   Respiratory assessment: No respiratory distress, normal respiratory rhythm and effort.    Auscultation of Lungs: Clear bilateral breath sounds.   Cardiovascular   Auscultation of heart: Apical pulse normal, heart rate and rhythm normal, normal S1 and S2, no murmurs and no pericardial rub.    Exam for edema: No peripheral edema.   Abdomen   Abdominal Exam: No bruits, normal bowel sounds, soft, non-tender, no abdominal mass palpated.    Liver and Spleen exam: No hepato-splenomegaly.   Musculoskeletal   Examination of gait: Normal.    Inspection of digits and nails: No clubbing or cyanosis of the fingernails.    Inspection/palpation of joints, bones and muscles: No joint swelling. Normal movement of all extremities.   Skin   Skin inspection: Normal skin color and pigmentation, normal skin turgor and no visible rash.   Neurologic   Cranial nerves: Nerves 2-12 were intact, no focal neuro defects.       Assessment/Plan           Patient with a past medical history of HTN, HLD, CKD IV, MNG ([facility] in 2026), Pancytopenia, COPD, permanent atrial fibrillation, embolic CVA, chronic systolic heart failure, chronic nonischemic cardiomyopathy, sick sinus syndrome s/p pacemaker/AICD DJD, Thrombocytopenia, goiter, Osteopenia (DEXA 2026), mammogram in July, Tubular Adenoma (due 2025)       # Multinodular goiter  We will check a TSH free T4     #Osteopenia  Start calcium and vitamin D  600 mg plus vitamin D  Daily exercise especially weightbearing  Stop smoking     #Hypertension  Stable continue home medications     #Dyslipidemia  Checking lipid panel  Target LDL less than 70     #Chronic kidney disease stage IV  Check BMP     # Tobacco dependence  CT Chest  Counseled on cessation 3 min    Blood work  Mammogram  Colonoscopy   CT Chest    Advance Directives Discussion  less than 30 minutes spent discussing Advanced Care Planning (including a Living Will, Healthcare POA, as well as specific end of life choices and/or directives). The details of that discussion were documented in Advanced Directives Discussion section of the medical record.         Depression Screening  _5_ minutes  were spent screening for depression using PHQ2/PHQ9 as documented in the chart.     Alcohol Screening  1__ minutes were spent screening for alcohol use/misuse as documented in the chart.          Tobacco Counseling  _3_ minutes were spent counseling the patient on tobacco cessation.  Benefits of cessation were discussed as well as techniques to help quit.      Cardiac Risk Assessment  __minutes were spent assessing and discussing cardiovascular risk was and, if needed, lifestyle modifications recommended, including nutritional choices, exercise, and elimination of habits contributing to risk. Aspirin use/disuse was discussed following the guidelines below.     Low dose ASA ( mg) should be considered:  If you have prior Heart Attack/Stroke/Peripheral vascular disease:  Generally recommend daily low dose aspirin unless extremely high bleeding risk (e.g., gastrointestinal).     If you do not have prior Heart Attack/Stroke/Peripheral vascular disease:    Age < 70 and your 10-year cardiovascular disease risk is >20%, use low dose Aspirin   Age >=70: Do not use Aspirin for prevention  Low Dose CT Screening  We discussed the  pros and cons of low dose CT screening for lung cancer based on the patient's smoking history.  This screening is recommended for patients who:  Are between the age of 50 to 77  Have a 20 pack-year smoking history (20 years of smoking a pack a day)  Are either a current smoker or have quit smoking within the last 15 years  Are in good health - no new cough or unexplained weight loss  Are willing to do the follow-up testing and treatment, if needed  Have not had a chest CT (CAT) scan in the last year         [1]   Past Medical History:  Diagnosis Date    Essential (primary) hypertension 11/18/2022    Hypertension    Other conditions influencing health status 08/18/2013    Nontoxic Goiter    Personal history of other endocrine, nutritional and metabolic disease     History of hyperlipidemia    Personal history of other specified conditions     History of shortness of breath

## 2025-07-02 LAB
ALBUMIN SERPL-MCNC: 4.2 G/DL (ref 3.6–5.1)
ALP SERPL-CCNC: 105 U/L (ref 37–153)
ALT SERPL-CCNC: 5 U/L (ref 6–29)
ANION GAP SERPL CALCULATED.4IONS-SCNC: 6 MMOL/L (CALC) (ref 7–17)
AST SERPL-CCNC: 11 U/L (ref 10–35)
BILIRUB SERPL-MCNC: 0.6 MG/DL (ref 0.2–1.2)
BUN SERPL-MCNC: 25 MG/DL (ref 7–25)
CALCIUM SERPL-MCNC: 9.6 MG/DL (ref 8.6–10.4)
CHLORIDE SERPL-SCNC: 109 MMOL/L (ref 98–110)
CHOLEST SERPL-MCNC: 175 MG/DL
CHOLEST/HDLC SERPL: 4.7 (CALC)
CO2 SERPL-SCNC: 27 MMOL/L (ref 20–32)
CREAT SERPL-MCNC: 2.13 MG/DL (ref 0.6–1)
EGFRCR SERPLBLD CKD-EPI 2021: 24 ML/MIN/1.73M2
ERYTHROCYTE [DISTWIDTH] IN BLOOD BY AUTOMATED COUNT: 13.7 % (ref 11–15)
EST. AVERAGE GLUCOSE BLD GHB EST-MCNC: 131 MG/DL
EST. AVERAGE GLUCOSE BLD GHB EST-SCNC: 7.3 MMOL/L
GLUCOSE SERPL-MCNC: 108 MG/DL (ref 65–99)
HBA1C MFR BLD: 6.2 %
HCT VFR BLD AUTO: 43.7 % (ref 35–45)
HCV AB SERPL QL IA: NORMAL
HDLC SERPL-MCNC: 37 MG/DL
HGB BLD-MCNC: 13.2 G/DL (ref 11.7–15.5)
LDLC SERPL CALC-MCNC: 120 MG/DL (CALC)
MCH RBC QN AUTO: 30.6 PG (ref 27–33)
MCHC RBC AUTO-ENTMCNC: 30.2 G/DL (ref 32–36)
MCV RBC AUTO: 101.2 FL (ref 80–100)
NONHDLC SERPL-MCNC: 138 MG/DL (CALC)
PLATELET # BLD AUTO: 95 THOUSAND/UL (ref 140–400)
PMV BLD REES-ECKER: 13.4 FL (ref 7.5–12.5)
POTASSIUM SERPL-SCNC: 5.2 MMOL/L (ref 3.5–5.3)
PROT SERPL-MCNC: 6.3 G/DL (ref 6.1–8.1)
RBC # BLD AUTO: 4.32 MILLION/UL (ref 3.8–5.1)
SODIUM SERPL-SCNC: 142 MMOL/L (ref 135–146)
TRIGL SERPL-MCNC: 85 MG/DL
TSH SERPL-ACNC: 0.74 MIU/L (ref 0.4–4.5)
WBC # BLD AUTO: 4.2 THOUSAND/UL (ref 3.8–10.8)

## 2025-07-03 NOTE — RESULT ENCOUNTER NOTE
Results reviewed.  All tests within acceptable range.  Platelets are low but they are stable when compared to 2024  Sugar numbers are also stable  Please call if you have any questions or concerns.

## 2025-07-15 DIAGNOSIS — J30.9 ALLERGIC SINUSITIS: ICD-10-CM

## 2025-07-15 RX ORDER — FLUTICASONE PROPIONATE 50 MCG
2 SPRAY, SUSPENSION (ML) NASAL DAILY
Qty: 48 ML | Refills: 0 | Status: SHIPPED | OUTPATIENT
Start: 2025-07-15

## 2025-08-20 ENCOUNTER — HOSPITAL ENCOUNTER (OUTPATIENT)
Dept: RADIOLOGY | Facility: CLINIC | Age: 70
Discharge: HOME | End: 2025-08-20
Payer: MEDICARE

## 2025-08-20 DIAGNOSIS — I48.11 LONGSTANDING PERSISTENT ATRIAL FIBRILLATION (MULTI): ICD-10-CM

## 2025-08-20 DIAGNOSIS — I10 PRIMARY HYPERTENSION: ICD-10-CM

## 2025-08-20 DIAGNOSIS — F17.210 TOBACCO DEPENDENCE DUE TO CIGARETTES: ICD-10-CM

## 2025-08-20 DIAGNOSIS — F17.200 TOBACCO DEPENDENCE: ICD-10-CM

## 2025-08-20 DIAGNOSIS — E78.49 OTHER HYPERLIPIDEMIA: ICD-10-CM

## 2025-08-20 PROCEDURE — 71271 CT THORAX LUNG CANCER SCR C-: CPT

## 2025-09-15 ENCOUNTER — APPOINTMENT (OUTPATIENT)
Dept: RADIOLOGY | Facility: CLINIC | Age: 70
End: 2025-09-15
Payer: MEDICARE

## 2026-01-08 ENCOUNTER — APPOINTMENT (OUTPATIENT)
Dept: PRIMARY CARE | Facility: CLINIC | Age: 71
End: 2026-01-08
Payer: MEDICARE

## (undated) DEVICE — PATCHES, EXTERNAL REFERENCE, CARTO3

## (undated) DEVICE — TUBING SET, IRRIGATION, SMARTABLATE

## (undated) DEVICE — CATHETHER, CS, BI-DIRECTIONAL, 10 POLES, D-F TYPE

## (undated) DEVICE — INTRODUCER, SHEATH, FAST-CATH, 8FR X 12CM, C-LOCK

## (undated) DEVICE — Device

## (undated) DEVICE — NEEDLE, TRANSSEPTAL, CURVE, W/STYLET, ADULT, 18 G X 71 CM

## (undated) DEVICE — CATHETER, THERMOCOOL SMART TOUCH, SF, D-F CURVE

## (undated) DEVICE — INTRODUCER SYSTEM, PRELUDE SNAP, SPLITTABLE, HEMOSTATIC, 6FR

## (undated) DEVICE — INTRODUCER, HEMOSTASIS, STR/J .038 IN, 8.5FR 12CM

## (undated) DEVICE — CATHETER, PENTARAY, NAV ECO, 7FR, 2-6-2 SPACING, F CURVE

## (undated) DEVICE — CATHETER, ULTRASOUND, DIAGNOSTIC, ACUNAV, 10 FR X 90 CM

## (undated) DEVICE — INTRODUCER SYSTEM, PRELUDE SNAP, SPLITTABLE, HEMOSTATIC, 7FR

## (undated) DEVICE — INTRODUCER, CATHETER, HEMOSTASIS, FAST-CATH, 11 FR X 12 CM